# Patient Record
Sex: FEMALE | Race: WHITE | Employment: UNEMPLOYED | URBAN - METROPOLITAN AREA
[De-identification: names, ages, dates, MRNs, and addresses within clinical notes are randomized per-mention and may not be internally consistent; named-entity substitution may affect disease eponyms.]

---

## 2019-06-18 ENCOUNTER — APPOINTMENT (OUTPATIENT)
Dept: GENERAL RADIOLOGY | Age: 74
End: 2019-06-18
Payer: MEDICARE

## 2019-06-18 ENCOUNTER — APPOINTMENT (OUTPATIENT)
Dept: MRI IMAGING | Age: 74
End: 2019-06-18
Payer: MEDICARE

## 2019-06-18 ENCOUNTER — APPOINTMENT (OUTPATIENT)
Dept: ULTRASOUND IMAGING | Age: 74
End: 2019-06-18
Payer: MEDICARE

## 2019-06-18 ENCOUNTER — HOSPITAL ENCOUNTER (EMERGENCY)
Age: 74
Discharge: ANOTHER HOSPITAL | End: 2019-06-18
Attending: EMERGENCY MEDICINE
Payer: MEDICARE

## 2019-06-18 VITALS
TEMPERATURE: 98.6 F | DIASTOLIC BLOOD PRESSURE: 62 MMHG | HEIGHT: 60 IN | BODY MASS INDEX: 27.09 KG/M2 | HEART RATE: 93 BPM | SYSTOLIC BLOOD PRESSURE: 151 MMHG | OXYGEN SATURATION: 92 % | RESPIRATION RATE: 18 BRPM | WEIGHT: 138 LBS

## 2019-06-18 DIAGNOSIS — R29.898 WEAKNESS OF LEFT LOWER EXTREMITY: ICD-10-CM

## 2019-06-18 DIAGNOSIS — M79.605 PAIN OF LEFT LOWER EXTREMITY: Primary | ICD-10-CM

## 2019-06-18 LAB
ALBUMIN SERPL-MCNC: 3.4 GM/DL (ref 3.4–5)
ALP BLD-CCNC: 75 IU/L (ref 40–128)
ALT SERPL-CCNC: <5 U/L (ref 10–40)
ANION GAP SERPL CALCULATED.3IONS-SCNC: 11 MMOL/L (ref 4–16)
AST SERPL-CCNC: 11 IU/L (ref 15–37)
BASOPHILS ABSOLUTE: 0.1 K/CU MM
BASOPHILS RELATIVE PERCENT: 0.6 % (ref 0–1)
BILIRUB SERPL-MCNC: 0.2 MG/DL (ref 0–1)
BUN BLDV-MCNC: 11 MG/DL (ref 6–23)
CALCIUM SERPL-MCNC: 8.5 MG/DL (ref 8.3–10.6)
CHLORIDE BLD-SCNC: 99 MMOL/L (ref 99–110)
CO2: 22 MMOL/L (ref 21–32)
CREAT SERPL-MCNC: 0.7 MG/DL (ref 0.6–1.1)
DIFFERENTIAL TYPE: ABNORMAL
EOSINOPHILS ABSOLUTE: 0.3 K/CU MM
EOSINOPHILS RELATIVE PERCENT: 2.4 % (ref 0–3)
ERYTHROCYTE SEDIMENTATION RATE: 55 MM/HR (ref 0–30)
GFR AFRICAN AMERICAN: >60 ML/MIN/1.73M2
GFR NON-AFRICAN AMERICAN: >60 ML/MIN/1.73M2
GLUCOSE BLD-MCNC: 185 MG/DL (ref 70–99)
HCT VFR BLD CALC: 35.3 % (ref 37–47)
HEMOGLOBIN: 10.9 GM/DL (ref 12.5–16)
IMMATURE NEUTROPHIL %: 0.4 % (ref 0–0.43)
LYMPHOCYTES ABSOLUTE: 1.2 K/CU MM
LYMPHOCYTES RELATIVE PERCENT: 10.2 % (ref 24–44)
MCH RBC QN AUTO: 29.5 PG (ref 27–31)
MCHC RBC AUTO-ENTMCNC: 30.9 % (ref 32–36)
MCV RBC AUTO: 95.7 FL (ref 78–100)
MONOCYTES ABSOLUTE: 1 K/CU MM
MONOCYTES RELATIVE PERCENT: 8.6 % (ref 0–4)
NUCLEATED RBC %: 0 %
PDW BLD-RTO: 14.6 % (ref 11.7–14.9)
PLATELET # BLD: 296 K/CU MM (ref 140–440)
PMV BLD AUTO: 9.4 FL (ref 7.5–11.1)
POTASSIUM SERPL-SCNC: 4.7 MMOL/L (ref 3.5–5.1)
RBC # BLD: 3.69 M/CU MM (ref 4.2–5.4)
SEGMENTED NEUTROPHILS ABSOLUTE COUNT: 8.9 K/CU MM
SEGMENTED NEUTROPHILS RELATIVE PERCENT: 77.8 % (ref 36–66)
SODIUM BLD-SCNC: 132 MMOL/L (ref 135–145)
TOTAL IMMATURE NEUTOROPHIL: 0.05 K/CU MM
TOTAL NUCLEATED RBC: 0 K/CU MM
TOTAL PROTEIN: 6.6 GM/DL (ref 6.4–8.2)
WBC # BLD: 11.4 K/CU MM (ref 4–10.5)

## 2019-06-18 PROCEDURE — 85025 COMPLETE CBC W/AUTO DIFF WBC: CPT

## 2019-06-18 PROCEDURE — 96375 TX/PRO/DX INJ NEW DRUG ADDON: CPT

## 2019-06-18 PROCEDURE — 99285 EMERGENCY DEPT VISIT HI MDM: CPT

## 2019-06-18 PROCEDURE — 93971 EXTREMITY STUDY: CPT

## 2019-06-18 PROCEDURE — 6370000000 HC RX 637 (ALT 250 FOR IP): Performed by: EMERGENCY MEDICINE

## 2019-06-18 PROCEDURE — 85652 RBC SED RATE AUTOMATED: CPT

## 2019-06-18 PROCEDURE — A9579 GAD-BASE MR CONTRAST NOS,1ML: HCPCS

## 2019-06-18 PROCEDURE — 96374 THER/PROPH/DIAG INJ IV PUSH: CPT

## 2019-06-18 PROCEDURE — 6360000002 HC RX W HCPCS: Performed by: EMERGENCY MEDICINE

## 2019-06-18 PROCEDURE — 6360000002 HC RX W HCPCS: Performed by: PHYSICIAN ASSISTANT

## 2019-06-18 PROCEDURE — 73560 X-RAY EXAM OF KNEE 1 OR 2: CPT

## 2019-06-18 PROCEDURE — 72158 MRI LUMBAR SPINE W/O & W/DYE: CPT

## 2019-06-18 PROCEDURE — 6360000004 HC RX CONTRAST MEDICATION

## 2019-06-18 PROCEDURE — 96376 TX/PRO/DX INJ SAME DRUG ADON: CPT

## 2019-06-18 PROCEDURE — 4500000027

## 2019-06-18 PROCEDURE — 80053 COMPREHEN METABOLIC PANEL: CPT

## 2019-06-18 RX ORDER — GABAPENTIN 100 MG/1
200 CAPSULE ORAL ONCE
Status: COMPLETED | OUTPATIENT
Start: 2019-06-18 | End: 2019-06-18

## 2019-06-18 RX ORDER — MORPHINE SULFATE 4 MG/ML
4 INJECTION, SOLUTION INTRAMUSCULAR; INTRAVENOUS ONCE
Status: COMPLETED | OUTPATIENT
Start: 2019-06-18 | End: 2019-06-18

## 2019-06-18 RX ORDER — OXYCODONE HYDROCHLORIDE 5 MG/1
5 CAPSULE ORAL EVERY 4 HOURS PRN
COMMUNITY

## 2019-06-18 RX ORDER — METHOCARBAMOL 750 MG/1
750 TABLET, FILM COATED ORAL 4 TIMES DAILY PRN
COMMUNITY

## 2019-06-18 RX ORDER — GLIPIZIDE 10 MG/1
10 TABLET ORAL DAILY
COMMUNITY

## 2019-06-18 RX ORDER — CARVEDILOL 6.25 MG/1
6.25 TABLET ORAL 2 TIMES DAILY WITH MEALS
COMMUNITY

## 2019-06-18 RX ORDER — ONDANSETRON 2 MG/ML
4 INJECTION INTRAMUSCULAR; INTRAVENOUS ONCE
Status: COMPLETED | OUTPATIENT
Start: 2019-06-18 | End: 2019-06-18

## 2019-06-18 RX ORDER — GABAPENTIN 100 MG/1
200 CAPSULE ORAL EVERY 6 HOURS
COMMUNITY

## 2019-06-18 RX ADMIN — MORPHINE SULFATE 4 MG: 4 INJECTION INTRAVENOUS at 11:11

## 2019-06-18 RX ADMIN — ONDANSETRON 4 MG: 2 INJECTION INTRAMUSCULAR; INTRAVENOUS at 02:20

## 2019-06-18 RX ADMIN — MORPHINE SULFATE 4 MG: 4 INJECTION INTRAVENOUS at 05:56

## 2019-06-18 RX ADMIN — MORPHINE SULFATE 4 MG: 4 INJECTION INTRAVENOUS at 02:20

## 2019-06-18 RX ADMIN — GADOTERIDOL 13 ML: 279.3 INJECTION, SOLUTION INTRAVENOUS at 06:37

## 2019-06-18 RX ADMIN — GABAPENTIN 200 MG: 100 CAPSULE ORAL at 10:20

## 2019-06-18 SDOH — HEALTH STABILITY: MENTAL HEALTH: HOW OFTEN DO YOU HAVE A DRINK CONTAINING ALCOHOL?: NEVER

## 2019-06-18 ASSESSMENT — PAIN DESCRIPTION - ORIENTATION: ORIENTATION: LEFT

## 2019-06-18 ASSESSMENT — PAIN DESCRIPTION - LOCATION: LOCATION: KNEE

## 2019-06-18 ASSESSMENT — PAIN SCALES - GENERAL
PAINLEVEL_OUTOF10: 10
PAINLEVEL_OUTOF10: 9
PAINLEVEL_OUTOF10: 10
PAINLEVEL_OUTOF10: 9

## 2019-06-18 ASSESSMENT — PAIN DESCRIPTION - PAIN TYPE: TYPE: ACUTE PAIN

## 2019-06-18 NOTE — ED PROVIDER NOTES
PCP: Beuford Severs    CHIEF COMPLAINT    Chief Complaint   Patient presents with    Knee Pain     L knee       HPI    In brief, Iris Son is a 75 y.o. female who presents with previous back surgery now with leg pain and weakness. Signout by Dr. Rey Vega for pending MRI lumbar spine. PHYSICAL EXAM    VITAL SIGNS: BP (!) 133/59   Pulse 86   Temp 98.2 °F (36.8 °C) (Oral)   Resp 18   Ht 4' 11.5\" (1.511 m)   Wt 138 lb (62.6 kg)   SpO2 93%   BMI 27.41 kg/m²    Focused exam revealed awake, alert, well-hydrated, well-nourished, and in no acute distress. Speech is clear. Breathing is unlabored. Skin is dry. Mental status is normal. The patient moves all extremities. ED COURSE & MEDICAL DECISION MAKING       Vital signs and nursing notes reviewed during ED course. All pertinent Lab data and radiographic results reviewed with patient at bedside. The patient and/or the family were informed of the results of any tests/labs/imaging, the treatment plan, and time was allotted to answer questions. 69-year-old female who was signed out to me by the previous provider pending MRI of the spine. See their note for further details. Patient on exam does have weakness in dorsiflexion of the left foot, also the left great toe and some mild plantarflexion of the left foot. The pain and weakness are reported to be new since last night. CT scan does show L1 burst fracture with fragments compressing the conus. This does appear to be from her previous injury for which she had surgery. I did speak with Dr. Michael Castle who is the patient's neurosurgeon. As she does have new pain and weakness in the left lower extremity and had been up walking using a walker with a back brace prior to this and is no longer able to do this plan will be transferred to [facility] for further evaluation. I spoke with Dr. Cornell Melara, who agrees to accept the patient in transfer. She is currently stable for transfer.     Clinical IMPRESSION    Left lower extremity pain, left foot weakness, L1 fracture with cord contusion post surgical           (Please note the MDM and HPI sections of this note were completed with a voice recognition program.  Efforts were made to edit the dictations but occasionally words are mis-transcribed.)      Mark Hollis DO  06/18/19 1025

## 2019-06-18 NOTE — ED PROVIDER NOTES
CELESTINA STAFFING NOTED    I independently examined and evaluated Pamela Prieto. In brief their history revealed left lower extremity pain for last 24 hours. Patient denies any injury or trauma. She is on Xarelto for previous stroke. Patient had a spinal procedure in her lumbar spine in May at [facility].  She is denying any current back pain but does report diffuse lower extremity pain from her left hip/groin region all the way to her ankle. Patient usually walks with a walker at home however for the last 24 hours has not been able to get around. Since being released from rehab on June 11, patient has had issues with urination requiring straight cath. Patient denies any urinary incontinence, bowel incontinence/retention or saddle paresthesias. On exam, the patient appears well-hydrated, well-nourished, and in no acute distress. Mucous membranes are moist. Speech is clear. Breathing is unlabored. Skin is dry. Mental status is normal. Patient is without facial droop. Surgical incision over lower lumbar region is clean/dry/intact without overlying warmth erythema. HIGH sensitivity Neuro Exam for Lumbar spine  -(L1-L2)  inner thigh sensation intact  -(S3,4,5) groin sensation intact      -Lower extremity ROM testing       -(L2) cross legs (adduct thighs)        -(L3) extend knees & flex knees (S1);  5/5 in right lower extremity, 4/5 in left lower extremity       -(L4) dorsiflex ankles; 5/5 in right lower extremity, 4/5 in left lower extremity       -(L5) point great toes upward & plantar flex toes (S2)        -(L2,3,4) Knee reflexes intact (2+) bilaterally  Achilles reflexes intact (2+) bilaterally          ED course: Vitals are stable. Patient's labs fairly unrevealing aside from a mild leukocytosis. Patient does have an elevated ESR at 55. Patient had lumbar surgery in May 2019. Her incision is clean/dry/intact with no evidence of superficial skin infection.   Patient does have weakness on
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
## 2019-06-18 NOTE — ED NOTES
Cath placed at this time d/t self cathing. Daughter reports pt has had to straight cath since back surgery.      Jordyn Ornelas RN  06/18/19 4897

## 2019-06-18 NOTE — ED NOTES
Transport at bedside. Patient is now requesting the morphine that she had previously denied. Dr Corona Carlos notified. Verbal order received to give Morphine that was previously ordered but not given. Arlene Collier RN  06/18/19 0914

## 2019-06-18 NOTE — ED TRIAGE NOTES
Patient refused the morphine for pain. She states that it does nothing for her legs which is what is hurting her. She states she would like her nerve pill, which she believes is gabapentin. Patient was unsure of the dosage. Daughter Earnest Flores called who helps her with her medications. Med rec completed. Verbal order received from Dr Rickie Cook to order the patient's home dose of gabapentin.

## 2019-06-18 NOTE — ED NOTES
Report called to [facility] [facility] RN. Awaiting transport. Arlene Villavicencio RN  06/18/19 2238

## 2019-06-18 NOTE — ED NOTES
QCT at bedside to transport the patient. Report given. Arlene Howard [facility]  06/18/19 6499

## 2022-07-13 NOTE — ED NOTES
Bed: ED-18  Expected date:   Expected time:   Means of arrival:   Comments:  EMS- 75 F leg pain     Zoraida Chaudhary RN  06/18/19 6886 Check labs  Stop aldactone

## 2024-06-20 ENCOUNTER — HOSPITAL ENCOUNTER (OUTPATIENT)
Dept: WOUND CARE | Age: 79
Discharge: HOME | End: 2024-06-20
Payer: MEDICARE

## 2024-06-20 VITALS
DIASTOLIC BLOOD PRESSURE: 70 MMHG | HEART RATE: 76 BPM | SYSTOLIC BLOOD PRESSURE: 165 MMHG | RESPIRATION RATE: 18 BRPM | TEMPERATURE: 96.9 F

## 2024-06-20 DIAGNOSIS — E66.3 OVERWEIGHT (BMI 25.0-29.9): ICD-10-CM

## 2024-06-20 DIAGNOSIS — L89.313: ICD-10-CM

## 2024-06-20 DIAGNOSIS — N39.45 CONTINUOUS LEAKAGE OF URINE: ICD-10-CM

## 2024-06-20 DIAGNOSIS — E11.622 TYPE 2 DIABETES MELLITUS WITH OTHER SKIN ULCER, WITHOUT LONG-TERM CURRENT USE OF INSULIN (HCC): ICD-10-CM

## 2024-06-20 DIAGNOSIS — I86.3 LABIAL VARICOSITIES: ICD-10-CM

## 2024-06-20 DIAGNOSIS — E11.42 DIABETIC POLYNEUROPATHY ASSOCIATED WITH TYPE 2 DIABETES MELLITUS (HCC): ICD-10-CM

## 2024-06-20 DIAGNOSIS — I89.0 LYMPHEDEMA OF BOTH LOWER EXTREMITIES: ICD-10-CM

## 2024-06-20 DIAGNOSIS — L89.213 DECUBITUS ULCER OF RIGHT THIGH, STAGE 3 (HCC): Primary | ICD-10-CM

## 2024-06-20 DIAGNOSIS — I87.2 VENOUS STASIS DERMATITIS OF BOTH LOWER EXTREMITIES: ICD-10-CM

## 2024-06-20 PROBLEM — E11.628 TYPE 2 DIABETES MELLITUS WITH SKIN COMPLICATION, WITHOUT LONG-TERM CURRENT USE OF INSULIN (HCC): Status: ACTIVE | Noted: 2024-06-20

## 2024-06-20 PROCEDURE — 99203 OFFICE O/P NEW LOW 30 MIN: CPT

## 2024-06-20 PROCEDURE — 99213 OFFICE O/P EST LOW 20 MIN: CPT

## 2024-06-20 PROCEDURE — 99203 OFFICE O/P NEW LOW 30 MIN: CPT | Performed by: NURSE PRACTITIONER

## 2024-06-20 PROCEDURE — 11042 DBRDMT SUBQ TIS 1ST 20SQCM/<: CPT

## 2024-06-20 PROCEDURE — 11042 DBRDMT SUBQ TIS 1ST 20SQCM/<: CPT | Performed by: NURSE PRACTITIONER

## 2024-06-20 ASSESSMENT — PAIN DESCRIPTION - DESCRIPTORS: DESCRIPTORS: SORE

## 2024-06-20 ASSESSMENT — PAIN SCALES - GENERAL: PAINLEVEL_OUTOF10: 9

## 2024-06-20 ASSESSMENT — PAIN DESCRIPTION - ONSET: ONSET: ON-GOING

## 2024-06-20 ASSESSMENT — PAIN - FUNCTIONAL ASSESSMENT: PAIN_FUNCTIONAL_ASSESSMENT: PREVENTS OR INTERFERES SOME ACTIVE ACTIVITIES AND ADLS

## 2024-06-20 ASSESSMENT — PAIN DESCRIPTION - LOCATION: LOCATION: BUTTOCKS

## 2024-06-20 ASSESSMENT — PAIN DESCRIPTION - PAIN TYPE: TYPE: ACUTE PAIN

## 2024-06-20 ASSESSMENT — PAIN DESCRIPTION - FREQUENCY: FREQUENCY: CONTINUOUS

## 2024-06-20 NOTE — PROGRESS NOTES
susceptibility  artifact related to the hardware from the fusion.    SPINAL CORD:  The conus terminates near the level of the L1-L2 disc space.  The cauda equina nerve roots are unremarkable.  There appears to be mild  edema in the cord at the level of L1 which may be related to cord contusion.  No areas of abnormal enhancement are noted.    SOFT TISSUES: No paraspinal masses are identified.    T12-L1: The L1 vertebral body is retropulsed into the spinal canal with  flattening along the right anterolateral margin of the conus.  L1 laminectomy  is noted.  There is severe central spinal canal narrowing.  Mild bilateral  foraminal narrowing.    L1-L2: There is a minimal disc bulge-osteophyte complex lateralizing to the  left.  Minimal central spinal canal narrowing.  No right foraminal narrowing.  Mild left foraminal narrowing.    L2-L3: There is a minimal disc bulge indenting the anterior thecal sac.  There is facet arthropathy and ligamentum flavum thickening.  No central  spinal canal stenosis.  The neural foramen are patent.    L3-L4: There is a disc bulge indenting the anterior thecal sac measuring  approximately 1 mm.  There is no central spinal canal narrowing.  The neural  foramen are patent.    L4-L5: There is a 2 mm disc bulge lateralizing to the left.  There is facet  arthropathy and ligamentum flavum thickening.  Minimal central spinal canal  narrowing.  The neural foramen are patent.    L5-S1: There is no significant disc protrusion, spinal canal stenosis or  neural foraminal narrowing.  Impression: 1. Acute/subacute L1 burst fracture with retropulsion of fracture fragments  into the spinal canal, compressing the right anterior margin of the conus  with mild cord contusion.  This has approximately 50% loss of vertebral body  height.  2. Postsurgical changes are noted from a T11-L3 posterior metallic fusion  with associated L1 laminectomy.  No postoperative complication.  3. No other acute fracture.  VL DUP

## 2024-06-20 NOTE — PLAN OF CARE
Patient Info:    Minoo Molly  [address] [place] [address]  [phone]  : 1945  Age: 79 y.o.  Gender: female  Todays Date: 2024    Insurance Info:    Plan: ANTHRODRI MEDIBLUE ESSENTIAL/PLUS  Coverage: BCBS MEDICARE  Effective Date: 2018  Group Number: [unfilled]  Subscriber Number: [id number] - (Medicare Managed)    Payer/Plan Subscr  Sex Relation Sub. Ins. ID Effective Group Num   1. [facility] MEDICARE* MOLLYMINOO 1945 Female Self [id number]                                    [id number]

## 2024-06-20 NOTE — PATIENT INSTRUCTIONS
PHYSICIAN ORDERS AND DISCHARGE INSTRUCTIONS    Wound cleansing:     Do not scrub or use excessive force.   Wash hands with soap and water before and after dressing changes.   Prior to applying a clean dressing, cleanse wound with normal saline,               wound cleanser, or mild soap and water.    Ask the physician or nurse before getting the wound(s) wet in a shower      Wound Care Notes:           Orders for this week:  2024       Referral being sent to [facility]     Right Thigh :Wash with soap and water. Pat dry.   Apply Gentamicin, Puracol Ag to wound bed.  Secure with Mastisol,Gentac andTegaderm secured with Medipore  Change Daily and with each incontinence.     Right Labia- Clean with soap and water, pat dry.  Apply Desatin and Stimulen to wound bed. Apply 2x day and with each incontinence.     Dispense 30 day quantity when ordering supplies.  Plan:       Nurse Visit:   Follow Up Instructions: At the Wound Care Center in 1 week   Primary Wound Care Provider: Merly Rodriguez CNP   Call  for any questions or concerns.  Central [phone] for imaging and lab work

## 2024-06-20 NOTE — PLAN OF CARE
Patient Info:    Minoo Molly  [address] [place] [address]  [phone]  : 1945  Age: 79 y.o.  Gender: female  Todays Date: 2024    Insurance Info:    Plan: ANTHRODRI MEDIBLUE ESSENTIAL/PLUS  Coverage: BCBS MEDICARE  Effective Date: 2018  Group Number: [unfilled]  Subscriber Number: [id number] - (Medicare Managed)    Payer/Plan Subscr  Sex Relation Sub. Ins. ID Effective Group Num   1. [facility] MEDICARE* MOLLYMINOO 1945 Female Self [id number] [id number]                                    [id number]

## 2024-06-27 ENCOUNTER — HOSPITAL ENCOUNTER (OUTPATIENT)
Dept: WOUND CARE | Age: 79
Discharge: HOME | End: 2024-06-27
Payer: MEDICARE

## 2024-06-27 VITALS
DIASTOLIC BLOOD PRESSURE: 60 MMHG | TEMPERATURE: 98.3 F | HEART RATE: 81 BPM | RESPIRATION RATE: 16 BRPM | SYSTOLIC BLOOD PRESSURE: 128 MMHG

## 2024-06-27 DIAGNOSIS — N39.45 CONTINUOUS LEAKAGE OF URINE: ICD-10-CM

## 2024-06-27 DIAGNOSIS — I89.0 LYMPHEDEMA OF BOTH LOWER EXTREMITIES: ICD-10-CM

## 2024-06-27 DIAGNOSIS — E11.42 DIABETIC POLYNEUROPATHY ASSOCIATED WITH TYPE 2 DIABETES MELLITUS (HCC): ICD-10-CM

## 2024-06-27 DIAGNOSIS — E66.3 OVERWEIGHT (BMI 25.0-29.9): ICD-10-CM

## 2024-06-27 DIAGNOSIS — L89.213 DECUBITUS ULCER OF RIGHT THIGH, STAGE 3 (HCC): ICD-10-CM

## 2024-06-27 DIAGNOSIS — I87.2 VENOUS STASIS DERMATITIS OF BOTH LOWER EXTREMITIES: Primary | ICD-10-CM

## 2024-06-27 DIAGNOSIS — I86.3 LABIAL VARICOSITIES: ICD-10-CM

## 2024-06-27 DIAGNOSIS — E11.622 TYPE 2 DIABETES MELLITUS WITH OTHER SKIN ULCER, WITHOUT LONG-TERM CURRENT USE OF INSULIN (HCC): ICD-10-CM

## 2024-06-27 DIAGNOSIS — L89.313: ICD-10-CM

## 2024-06-27 PROCEDURE — 11042 DBRDMT SUBQ TIS 1ST 20SQCM/<: CPT

## 2024-06-27 ASSESSMENT — PAIN DESCRIPTION - ONSET: ONSET: ON-GOING

## 2024-06-27 ASSESSMENT — PAIN - FUNCTIONAL ASSESSMENT: PAIN_FUNCTIONAL_ASSESSMENT: PREVENTS OR INTERFERES SOME ACTIVE ACTIVITIES AND ADLS

## 2024-06-27 ASSESSMENT — PAIN DESCRIPTION - PAIN TYPE: TYPE: ACUTE PAIN

## 2024-06-27 ASSESSMENT — PAIN SCALES - GENERAL: PAINLEVEL_OUTOF10: 5

## 2024-06-27 ASSESSMENT — PAIN DESCRIPTION - FREQUENCY: FREQUENCY: CONTINUOUS

## 2024-06-27 ASSESSMENT — PAIN DESCRIPTION - DESCRIPTORS: DESCRIPTORS: SORE

## 2024-06-27 ASSESSMENT — PAIN DESCRIPTION - LOCATION: LOCATION: BUTTOCKS

## 2024-06-27 NOTE — PATIENT INSTRUCTIONS
PHYSICIAN ORDERS AND DISCHARGE INSTRUCTIONS    Wound cleansing:     Do not scrub or use excessive force.   Wash hands with soap and water before and after dressing changes.   Prior to applying a clean dressing, cleanse wound with normal saline,               wound cleanser, or mild soap and water.    Ask the physician or nurse before getting the wound(s) wet in a shower      Wound Care Notes:           Orders for this week:  2024    Right Labia - Clean with soap and water, pat dry.  Apply Desitin and Stimulen to wound bed. Apply 2x day and with each incontinence.     Dispense 30 day quantity when ordering supplies.  Plan:       Nurse Visit:   Follow Up Instructions: At the Wound Care Center in 2 week   Primary Wound Care Provider: Merly Rodriguez CNP   Call  for any questions or concerns.  Central [phone] for imaging and lab work

## 2024-06-27 NOTE — PROGRESS NOTES
Wound Care Center Progress Visit      Kristyn Rascon  AGE: 79 y.o.   GENDER: female  : 1945  EPISODE DATE:  2024   Referred by: Livier Cisneros APRN - CNP     Subjective:     CHIEF COMPLAINT  WOUND   Problem List Items Addressed This Visit          Circulatory    Labial varicosities    Venous stasis dermatitis of both lower extremities - Primary       Endocrine    Type 2 diabetes mellitus with skin complication, without long-term current use of insulin (HCC)    Diabetic polyneuropathy associated with type 2 diabetes mellitus (HCC)       Other    Decubitus ulcer of right thigh, stage 3 (HCC)    Decubitus ulcer of right perineal (labia) ischial region, stage 3 (HCC)    Continuous leakage of urine    Overweight (BMI 25.0-29.9)    Lymphedema of both lower extremities     Chief Complaint   Patient presents with    Wound Check        HISTORY of PRESENT ILLNESS      Kristyn Rascon is a 79 y.o. female who presents to the Wound Clinic for evaluation and treatment of Chronic pressure  ulcer(s) of  right thigh and right perineum (labia).  The condition is of moderate severity. The ulcer has been present for approximately greater than six months at initial visit to the wound clinic 24. The underlying cause is thought to be pressure and moisture. The patients care to date has included previous wound care at the [place] wound clinic. The patient has significant underlying medical conditions as below. Livier Cisneros APRN - CNP is PCP.    Wound Pain Timing/Severity: waxing and waning, moderate  Quality of pain: aching, tender, pressure  Severity of pain:  3 / 10   Modifying Factors: venous stasis, lymphedema, diabetes, poor glucose control, chronic pressure, decreased mobility, shear force, incontinence of urine, and overweight  Associated Signs/Symptoms: edema, erythema, drainage, and pain    DEMETRIO: as indicated base on wound location and assessment    Wound infection: wound culture will be obtained as needed

## 2024-07-11 ENCOUNTER — HOSPITAL ENCOUNTER (OUTPATIENT)
Dept: WOUND CARE | Age: 79
Discharge: HOME | End: 2024-07-11

## 2024-07-11 NOTE — PATIENT INSTRUCTIONS
PHYSICIAN ORDERS AND DISCHARGE INSTRUCTIONS    Wound cleansing:     Do not scrub or use excessive force.   Wash hands with soap and water before and after dressing changes.   Prior to applying a clean dressing, cleanse wound with normal saline,               wound cleanser, or mild soap and water.    Ask the physician or nurse before getting the wound(s) wet in a shower      Wound Care Notes:           Orders for this week:  2024    Right Labia - Clean with soap and water, pat dry.  Apply Desitin and Stimulen to wound bed. Apply 2x day and with each incontinence.     Dispense 30 day quantity when ordering supplies.  Plan:       Nurse Visit:   Follow Up Instructions: At the Wound Care Center in 2 week   Primary Wound Care Provider: Merly Rodriguez CNP   Call  for any questions or concerns.  Central [phone] for imaging and lab work

## 2024-07-18 ENCOUNTER — HOSPITAL ENCOUNTER (OUTPATIENT)
Dept: WOUND CARE | Age: 79
Discharge: HOME | End: 2024-07-18

## 2024-07-18 NOTE — PATIENT INSTRUCTIONS
PHYSICIAN ORDERS AND DISCHARGE INSTRUCTIONS    Wound cleansing:     Do not scrub or use excessive force.   Wash hands with soap and water before and after dressing changes.   Prior to applying a clean dressing, cleanse wound with normal saline,               wound cleanser, or mild soap and water.    Ask the physician or nurse before getting the wound(s) wet in a shower      Wound Care Notes:           Orders for this week:  2024    Right Labia - Clean with soap and water, pat dry.  Apply Desitin and Stimulen to wound bed. Apply 2x day and with each incontinence.     Dispense 30 day quantity when ordering supplies.  Plan:       Nurse Visit:   Follow Up Instructions: At the Wound Care Center in 2 week   Primary Wound Care Provider: Merly Rodriguez CNP   Call  for any questions or concerns.  Central [phone] for imaging and lab work

## 2024-08-20 ENCOUNTER — TELEPHONE (OUTPATIENT)
Dept: WOUND CARE | Age: 79
End: 2024-08-20

## 2024-08-20 NOTE — TELEPHONE ENCOUNTER
Telephone call to follow up on status of wound ; left message for patient /family to return call to the [facility] if appointment is needed.

## 2024-09-05 ENCOUNTER — TELEPHONE (OUTPATIENT)
Dept: WOUND CARE | Age: 79
End: 2024-09-05

## 2024-09-05 NOTE — TELEPHONE ENCOUNTER
This SN called to follow up to follow up on status of wounds ; left message for patient/family to return call to the [facility] to schedule follow up appointment if needed.

## 2024-09-24 ENCOUNTER — TELEPHONE (OUTPATIENT)
Dept: WOUND CARE | Age: 79
End: 2024-09-24

## 2024-12-07 ENCOUNTER — APPOINTMENT (OUTPATIENT)
Dept: GENERAL RADIOLOGY | Age: 79
DRG: 481 | End: 2024-12-07
Attending: EMERGENCY MEDICINE
Payer: MEDICARE

## 2024-12-07 ENCOUNTER — HOSPITAL ENCOUNTER (INPATIENT)
Age: 79
LOS: 5 days | Discharge: NURSING, REHAB OR LONG-TERM CARE | DRG: 481 | End: 2024-12-12
Attending: EMERGENCY MEDICINE | Admitting: STUDENT IN AN ORGANIZED HEALTH CARE EDUCATION/TRAINING PROGRAM
Payer: MEDICARE

## 2024-12-07 DIAGNOSIS — S72.342A CLOSED DISPLACED SPIRAL FRACTURE OF SHAFT OF LEFT FEMUR, INITIAL ENCOUNTER (HCC): Primary | ICD-10-CM

## 2024-12-07 LAB
ANION GAP SERPL CALCULATED.3IONS-SCNC: 11 MMOL/L (ref 9–17)
BASOPHILS # BLD: 0.08 K/UL
BASOPHILS NFR BLD: 1 % (ref 0–1)
BUN SERPL-MCNC: 22 MG/DL (ref 7–20)
CALCIUM SERPL-MCNC: 8.7 MG/DL (ref 8.3–10.6)
CHLORIDE SERPL-SCNC: 105 MMOL/L (ref 99–110)
CO2 SERPL-SCNC: 21 MMOL/L (ref 21–32)
CREAT SERPL-MCNC: 1.2 MG/DL (ref 0.6–1.2)
EOSINOPHIL # BLD: 0.47 K/UL
EOSINOPHILS RELATIVE PERCENT: 4 % (ref 0–3)
ERYTHROCYTE [DISTWIDTH] IN BLOOD BY AUTOMATED COUNT: 13.9 % (ref 11.7–14.9)
GFR, ESTIMATED: 45 ML/MIN/1.73M2
GLUCOSE BLD-MCNC: 266 MG/DL (ref 74–99)
GLUCOSE SERPL-MCNC: 185 MG/DL (ref 74–99)
HCT VFR BLD AUTO: 35.8 % (ref 37–47)
HGB BLD-MCNC: 11.2 G/DL (ref 12.5–16)
IMM GRANULOCYTES # BLD AUTO: 0.09 K/UL
IMM GRANULOCYTES NFR BLD: 1 %
LYMPHOCYTES NFR BLD: 2.4 K/UL
LYMPHOCYTES RELATIVE PERCENT: 19 % (ref 24–44)
MCH RBC QN AUTO: 29.5 PG (ref 27–31)
MCHC RBC AUTO-ENTMCNC: 31.3 G/DL (ref 32–36)
MCV RBC AUTO: 94.2 FL (ref 78–100)
MONOCYTES NFR BLD: 0.93 K/UL
MONOCYTES NFR BLD: 7 % (ref 0–4)
NEUTROPHILS NFR BLD: 69 % (ref 36–66)
NEUTS SEG NFR BLD: 8.99 K/UL
PLATELET # BLD AUTO: 283 K/UL (ref 140–440)
PMV BLD AUTO: 10.1 FL (ref 7.5–11.1)
POTASSIUM SERPL-SCNC: 4.8 MMOL/L (ref 3.5–5.1)
RBC # BLD AUTO: 3.8 M/UL (ref 4.2–5.4)
SODIUM SERPL-SCNC: 136 MMOL/L (ref 136–145)
WBC OTHER # BLD: 13 K/UL (ref 4–10.5)

## 2024-12-07 PROCEDURE — 82962 GLUCOSE BLOOD TEST: CPT

## 2024-12-07 PROCEDURE — 99285 EMERGENCY DEPT VISIT HI MDM: CPT

## 2024-12-07 PROCEDURE — 6360000002 HC RX W HCPCS: Performed by: STUDENT IN AN ORGANIZED HEALTH CARE EDUCATION/TRAINING PROGRAM

## 2024-12-07 PROCEDURE — 73502 X-RAY EXAM HIP UNI 2-3 VIEWS: CPT

## 2024-12-07 PROCEDURE — 6370000000 HC RX 637 (ALT 250 FOR IP): Performed by: FAMILY MEDICINE

## 2024-12-07 PROCEDURE — 6370000000 HC RX 637 (ALT 250 FOR IP): Performed by: STUDENT IN AN ORGANIZED HEALTH CARE EDUCATION/TRAINING PROGRAM

## 2024-12-07 PROCEDURE — 85025 COMPLETE CBC W/AUTO DIFF WBC: CPT

## 2024-12-07 PROCEDURE — 2580000003 HC RX 258: Performed by: STUDENT IN AN ORGANIZED HEALTH CARE EDUCATION/TRAINING PROGRAM

## 2024-12-07 PROCEDURE — 96374 THER/PROPH/DIAG INJ IV PUSH: CPT

## 2024-12-07 PROCEDURE — 6360000002 HC RX W HCPCS: Performed by: EMERGENCY MEDICINE

## 2024-12-07 PROCEDURE — 1200000000 HC SEMI PRIVATE

## 2024-12-07 PROCEDURE — 80048 BASIC METABOLIC PNL TOTAL CA: CPT

## 2024-12-07 RX ORDER — OXYCODONE HYDROCHLORIDE 10 MG/1
10 TABLET ORAL EVERY 4 HOURS PRN
Status: DISCONTINUED | OUTPATIENT
Start: 2024-12-07 | End: 2024-12-08

## 2024-12-07 RX ORDER — POLYETHYLENE GLYCOL 3350 17 G/17G
17 POWDER, FOR SOLUTION ORAL DAILY PRN
Status: DISCONTINUED | OUTPATIENT
Start: 2024-12-07 | End: 2024-12-12 | Stop reason: HOSPADM

## 2024-12-07 RX ORDER — TROSPIUM CHLORIDE 20 MG/1
20 TABLET, FILM COATED ORAL NIGHTLY
Status: DISCONTINUED | OUTPATIENT
Start: 2024-12-08 | End: 2024-12-12 | Stop reason: HOSPADM

## 2024-12-07 RX ORDER — SODIUM CHLORIDE 0.9 % (FLUSH) 0.9 %
5-40 SYRINGE (ML) INJECTION PRN
Status: DISCONTINUED | OUTPATIENT
Start: 2024-12-07 | End: 2024-12-12 | Stop reason: HOSPADM

## 2024-12-07 RX ORDER — METHOCARBAMOL 500 MG/1
750 TABLET, FILM COATED ORAL 4 TIMES DAILY PRN
Status: DISCONTINUED | OUTPATIENT
Start: 2024-12-07 | End: 2024-12-12 | Stop reason: HOSPADM

## 2024-12-07 RX ORDER — HYDROMORPHONE HYDROCHLORIDE 1 MG/ML
1 INJECTION, SOLUTION INTRAMUSCULAR; INTRAVENOUS; SUBCUTANEOUS ONCE
Status: COMPLETED | OUTPATIENT
Start: 2024-12-07 | End: 2024-12-07

## 2024-12-07 RX ORDER — GABAPENTIN 100 MG/1
200 CAPSULE ORAL EVERY 6 HOURS
Status: DISCONTINUED | OUTPATIENT
Start: 2024-12-07 | End: 2024-12-07

## 2024-12-07 RX ORDER — TROSPIUM CHLORIDE 20 MG/1
20 TABLET, FILM COATED ORAL
Status: DISCONTINUED | OUTPATIENT
Start: 2024-12-08 | End: 2024-12-07

## 2024-12-07 RX ORDER — OXYCODONE HYDROCHLORIDE 5 MG/1
5 TABLET ORAL EVERY 4 HOURS PRN
Status: DISCONTINUED | OUTPATIENT
Start: 2024-12-07 | End: 2024-12-08

## 2024-12-07 RX ORDER — SODIUM CHLORIDE 9 MG/ML
INJECTION, SOLUTION INTRAVENOUS PRN
Status: DISCONTINUED | OUTPATIENT
Start: 2024-12-07 | End: 2024-12-12 | Stop reason: HOSPADM

## 2024-12-07 RX ORDER — GABAPENTIN 100 MG/1
200 CAPSULE ORAL 2 TIMES DAILY
Status: DISCONTINUED | OUTPATIENT
Start: 2024-12-08 | End: 2024-12-12 | Stop reason: HOSPADM

## 2024-12-07 RX ORDER — LEVOTHYROXINE SODIUM 88 UG/1
88 TABLET ORAL DAILY
Status: DISCONTINUED | OUTPATIENT
Start: 2024-12-08 | End: 2024-12-12 | Stop reason: HOSPADM

## 2024-12-07 RX ORDER — DEXTROSE MONOHYDRATE 100 MG/ML
INJECTION, SOLUTION INTRAVENOUS CONTINUOUS PRN
Status: DISCONTINUED | OUTPATIENT
Start: 2024-12-07 | End: 2024-12-12 | Stop reason: HOSPADM

## 2024-12-07 RX ORDER — SODIUM CHLORIDE 0.9 % (FLUSH) 0.9 %
5-40 SYRINGE (ML) INJECTION EVERY 12 HOURS SCHEDULED
Status: DISCONTINUED | OUTPATIENT
Start: 2024-12-07 | End: 2024-12-12 | Stop reason: HOSPADM

## 2024-12-07 RX ORDER — CARVEDILOL 6.25 MG/1
6.25 TABLET ORAL 2 TIMES DAILY WITH MEALS
Status: DISCONTINUED | OUTPATIENT
Start: 2024-12-07 | End: 2024-12-12 | Stop reason: HOSPADM

## 2024-12-07 RX ORDER — ONDANSETRON 4 MG/1
4 TABLET, ORALLY DISINTEGRATING ORAL EVERY 8 HOURS PRN
Status: DISCONTINUED | OUTPATIENT
Start: 2024-12-07 | End: 2024-12-08 | Stop reason: SDUPTHER

## 2024-12-07 RX ORDER — MAGNESIUM SULFATE IN WATER 40 MG/ML
2000 INJECTION, SOLUTION INTRAVENOUS PRN
Status: DISCONTINUED | OUTPATIENT
Start: 2024-12-07 | End: 2024-12-12 | Stop reason: HOSPADM

## 2024-12-07 RX ORDER — HYDROMORPHONE HYDROCHLORIDE 1 MG/ML
0.5 INJECTION, SOLUTION INTRAMUSCULAR; INTRAVENOUS; SUBCUTANEOUS
Status: COMPLETED | OUTPATIENT
Start: 2024-12-07 | End: 2024-12-07

## 2024-12-07 RX ORDER — ENOXAPARIN SODIUM 100 MG/ML
40 INJECTION SUBCUTANEOUS EVERY EVENING
Status: DISCONTINUED | OUTPATIENT
Start: 2024-12-08 | End: 2024-12-08

## 2024-12-07 RX ORDER — ACETAMINOPHEN 325 MG/1
650 TABLET ORAL EVERY 6 HOURS SCHEDULED
Status: DISCONTINUED | OUTPATIENT
Start: 2024-12-08 | End: 2024-12-12 | Stop reason: HOSPADM

## 2024-12-07 RX ORDER — POTASSIUM CHLORIDE 7.45 MG/ML
10 INJECTION INTRAVENOUS PRN
Status: DISCONTINUED | OUTPATIENT
Start: 2024-12-07 | End: 2024-12-12 | Stop reason: HOSPADM

## 2024-12-07 RX ORDER — GLUCAGON 1 MG/ML
1 KIT INJECTION PRN
Status: DISCONTINUED | OUTPATIENT
Start: 2024-12-07 | End: 2024-12-12 | Stop reason: HOSPADM

## 2024-12-07 RX ORDER — ONDANSETRON 2 MG/ML
4 INJECTION INTRAMUSCULAR; INTRAVENOUS EVERY 6 HOURS PRN
Status: DISCONTINUED | OUTPATIENT
Start: 2024-12-07 | End: 2024-12-08 | Stop reason: SDUPTHER

## 2024-12-07 RX ORDER — INSULIN LISPRO 100 [IU]/ML
0-4 INJECTION, SOLUTION INTRAVENOUS; SUBCUTANEOUS
Status: DISCONTINUED | OUTPATIENT
Start: 2024-12-07 | End: 2024-12-09

## 2024-12-07 RX ADMIN — HYDROMORPHONE HYDROCHLORIDE 0.5 MG: 1 INJECTION, SOLUTION INTRAMUSCULAR; INTRAVENOUS; SUBCUTANEOUS at 23:19

## 2024-12-07 RX ADMIN — ACETAMINOPHEN 650 MG: 325 TABLET ORAL at 23:18

## 2024-12-07 RX ADMIN — INSULIN LISPRO 2 UNITS: 100 INJECTION, SOLUTION INTRAVENOUS; SUBCUTANEOUS at 23:54

## 2024-12-07 RX ADMIN — HYDROMORPHONE HYDROCHLORIDE 1 MG: 1 INJECTION, SOLUTION INTRAMUSCULAR; INTRAVENOUS; SUBCUTANEOUS at 21:37

## 2024-12-07 RX ADMIN — SODIUM CHLORIDE, PRESERVATIVE FREE 10 ML: 5 INJECTION INTRAVENOUS at 23:25

## 2024-12-07 ASSESSMENT — PAIN SCALES - GENERAL
PAINLEVEL_OUTOF10: 8
PAINLEVEL_OUTOF10: 9
PAINLEVEL_OUTOF10: 10
PAINLEVEL_OUTOF10: 7

## 2024-12-07 ASSESSMENT — PAIN DESCRIPTION - ORIENTATION
ORIENTATION: LEFT
ORIENTATION: LEFT

## 2024-12-07 ASSESSMENT — PAIN DESCRIPTION - DESCRIPTORS
DESCRIPTORS: ACHING
DESCRIPTORS: SHOOTING;SHARP

## 2024-12-07 ASSESSMENT — PAIN DESCRIPTION - LOCATION
LOCATION: HIP;LEG
LOCATION: HIP

## 2024-12-07 ASSESSMENT — PAIN SCALES - WONG BAKER: WONGBAKER_NUMERICALRESPONSE: HURTS LITTLE MORE

## 2024-12-07 ASSESSMENT — PAIN - FUNCTIONAL ASSESSMENT
PAIN_FUNCTIONAL_ASSESSMENT: PREVENTS OR INTERFERES WITH ALL ACTIVE AND SOME PASSIVE ACTIVITIES
PAIN_FUNCTIONAL_ASSESSMENT: 0-10

## 2024-12-08 ENCOUNTER — ANESTHESIA (OUTPATIENT)
Dept: OPERATING ROOM | Age: 79
End: 2024-12-08
Payer: MEDICARE

## 2024-12-08 ENCOUNTER — ANESTHESIA EVENT (OUTPATIENT)
Dept: OPERATING ROOM | Age: 79
End: 2024-12-08
Payer: MEDICARE

## 2024-12-08 ENCOUNTER — APPOINTMENT (OUTPATIENT)
Dept: GENERAL RADIOLOGY | Age: 79
DRG: 481 | End: 2024-12-08
Payer: MEDICARE

## 2024-12-08 LAB
ALBUMIN SERPL-MCNC: 3.4 G/DL (ref 3.4–5)
ALBUMIN/GLOB SERPL: 1.1 {RATIO} (ref 1.1–2.2)
ALP SERPL-CCNC: 80 U/L (ref 40–129)
ALT SERPL-CCNC: 15 U/L (ref 10–40)
ANION GAP SERPL CALCULATED.3IONS-SCNC: 10 MMOL/L (ref 9–17)
AST SERPL-CCNC: 32 U/L (ref 15–37)
BASOPHILS # BLD: 0.04 K/UL
BASOPHILS NFR BLD: 1 % (ref 0–1)
BILIRUB SERPL-MCNC: 0.3 MG/DL (ref 0–1)
BUN SERPL-MCNC: 23 MG/DL (ref 7–20)
CALCIUM SERPL-MCNC: 8.7 MG/DL (ref 8.3–10.6)
CHLORIDE SERPL-SCNC: 104 MMOL/L (ref 99–110)
CO2 SERPL-SCNC: 21 MMOL/L (ref 21–32)
CREAT SERPL-MCNC: 1.1 MG/DL (ref 0.6–1.2)
EKG ATRIAL RATE: 80 BPM
EKG DIAGNOSIS: NORMAL
EKG P AXIS: 21 DEGREES
EKG P-R INTERVAL: 104 MS
EKG Q-T INTERVAL: 386 MS
EKG QRS DURATION: 138 MS
EKG QTC CALCULATION (BAZETT): 445 MS
EKG R AXIS: -10 DEGREES
EKG T AXIS: 217 DEGREES
EKG VENTRICULAR RATE: 80 BPM
EOSINOPHIL # BLD: 0.27 K/UL
EOSINOPHILS RELATIVE PERCENT: 3 % (ref 0–3)
ERYTHROCYTE [DISTWIDTH] IN BLOOD BY AUTOMATED COUNT: 13.4 % (ref 11.7–14.9)
GFR, ESTIMATED: 46 ML/MIN/1.73M2
GLUCOSE BLD-MCNC: 191 MG/DL (ref 74–99)
GLUCOSE BLD-MCNC: 216 MG/DL (ref 74–99)
GLUCOSE BLD-MCNC: 218 MG/DL (ref 74–99)
GLUCOSE BLD-MCNC: 248 MG/DL (ref 74–99)
GLUCOSE BLD-MCNC: 267 MG/DL (ref 74–99)
GLUCOSE SERPL-MCNC: 279 MG/DL (ref 74–99)
HCT VFR BLD AUTO: 33.9 % (ref 37–47)
HGB BLD-MCNC: 10.9 G/DL (ref 12.5–16)
IMM GRANULOCYTES # BLD AUTO: 0.02 K/UL
IMM GRANULOCYTES NFR BLD: 0 %
INR PPP: 1
LYMPHOCYTES NFR BLD: 1.57 K/UL
LYMPHOCYTES RELATIVE PERCENT: 19 % (ref 24–44)
MCH RBC QN AUTO: 29.8 PG (ref 27–31)
MCHC RBC AUTO-ENTMCNC: 32.2 G/DL (ref 32–36)
MCV RBC AUTO: 92.6 FL (ref 78–100)
MONOCYTES NFR BLD: 0.77 K/UL
MONOCYTES NFR BLD: 9 % (ref 0–4)
NEUTROPHILS NFR BLD: 68 % (ref 36–66)
NEUTS SEG NFR BLD: 5.75 K/UL
PLATELET # BLD AUTO: 313 K/UL (ref 140–440)
PMV BLD AUTO: 10.2 FL (ref 7.5–11.1)
POTASSIUM SERPL-SCNC: 4.9 MMOL/L (ref 3.5–5.1)
PROT SERPL-MCNC: 6.6 G/DL (ref 6.4–8.2)
PROTHROMBIN TIME: 14 SEC (ref 11.7–14.5)
RBC # BLD AUTO: 3.66 M/UL (ref 4.2–5.4)
SODIUM SERPL-SCNC: 136 MMOL/L (ref 136–145)
T4 FREE SERPL-MCNC: 0.8 NG/DL (ref 0.9–1.8)
TSH SERPL DL<=0.05 MIU/L-ACNC: 5.96 UIU/ML (ref 0.27–4.2)
WBC OTHER # BLD: 8.4 K/UL (ref 4–10.5)

## 2024-12-08 PROCEDURE — 85610 PROTHROMBIN TIME: CPT

## 2024-12-08 PROCEDURE — C1776 JOINT DEVICE (IMPLANTABLE): HCPCS | Performed by: ORTHOPAEDIC SURGERY

## 2024-12-08 PROCEDURE — 3600000014 HC SURGERY LEVEL 4 ADDTL 15MIN: Performed by: ORTHOPAEDIC SURGERY

## 2024-12-08 PROCEDURE — C1713 ANCHOR/SCREW BN/BN,TIS/BN: HCPCS | Performed by: ORTHOPAEDIC SURGERY

## 2024-12-08 PROCEDURE — 2780000010 HC IMPLANT OTHER: Performed by: ORTHOPAEDIC SURGERY

## 2024-12-08 PROCEDURE — 1200000000 HC SEMI PRIVATE

## 2024-12-08 PROCEDURE — 7100000000 HC PACU RECOVERY - FIRST 15 MIN: Performed by: ORTHOPAEDIC SURGERY

## 2024-12-08 PROCEDURE — 3700000000 HC ANESTHESIA ATTENDED CARE: Performed by: ORTHOPAEDIC SURGERY

## 2024-12-08 PROCEDURE — 99223 1ST HOSP IP/OBS HIGH 75: CPT | Performed by: ORTHOPAEDIC SURGERY

## 2024-12-08 PROCEDURE — 2720000010 HC SURG SUPPLY STERILE: Performed by: ORTHOPAEDIC SURGERY

## 2024-12-08 PROCEDURE — 84439 ASSAY OF FREE THYROXINE: CPT

## 2024-12-08 PROCEDURE — 93005 ELECTROCARDIOGRAM TRACING: CPT | Performed by: ANESTHESIOLOGY

## 2024-12-08 PROCEDURE — 84443 ASSAY THYROID STIM HORMONE: CPT

## 2024-12-08 PROCEDURE — 3600000004 HC SURGERY LEVEL 4 BASE: Performed by: ORTHOPAEDIC SURGERY

## 2024-12-08 PROCEDURE — 6360000002 HC RX W HCPCS

## 2024-12-08 PROCEDURE — 2580000003 HC RX 258: Performed by: ORTHOPAEDIC SURGERY

## 2024-12-08 PROCEDURE — 0QS706Z REPOSITION LEFT UPPER FEMUR WITH INTRAMEDULLARY INTERNAL FIXATION DEVICE, OPEN APPROACH: ICD-10-PCS | Performed by: ORTHOPAEDIC SURGERY

## 2024-12-08 PROCEDURE — 85025 COMPLETE CBC W/AUTO DIFF WBC: CPT

## 2024-12-08 PROCEDURE — 2580000003 HC RX 258: Performed by: STUDENT IN AN ORGANIZED HEALTH CARE EDUCATION/TRAINING PROGRAM

## 2024-12-08 PROCEDURE — 3700000001 HC ADD 15 MINUTES (ANESTHESIA): Performed by: ORTHOPAEDIC SURGERY

## 2024-12-08 PROCEDURE — 94761 N-INVAS EAR/PLS OXIMETRY MLT: CPT

## 2024-12-08 PROCEDURE — 6360000002 HC RX W HCPCS: Performed by: ORTHOPAEDIC SURGERY

## 2024-12-08 PROCEDURE — 6370000000 HC RX 637 (ALT 250 FOR IP): Performed by: ORTHOPAEDIC SURGERY

## 2024-12-08 PROCEDURE — 82962 GLUCOSE BLOOD TEST: CPT

## 2024-12-08 PROCEDURE — 2580000003 HC RX 258

## 2024-12-08 PROCEDURE — 93010 ELECTROCARDIOGRAM REPORT: CPT | Performed by: INTERNAL MEDICINE

## 2024-12-08 PROCEDURE — 7100000001 HC PACU RECOVERY - ADDTL 15 MIN: Performed by: ORTHOPAEDIC SURGERY

## 2024-12-08 PROCEDURE — 6370000000 HC RX 637 (ALT 250 FOR IP): Performed by: STUDENT IN AN ORGANIZED HEALTH CARE EDUCATION/TRAINING PROGRAM

## 2024-12-08 PROCEDURE — 2709999900 HC NON-CHARGEABLE SUPPLY: Performed by: ORTHOPAEDIC SURGERY

## 2024-12-08 PROCEDURE — 2700000000 HC OXYGEN THERAPY PER DAY

## 2024-12-08 PROCEDURE — 6360000002 HC RX W HCPCS: Performed by: ANESTHESIOLOGY

## 2024-12-08 PROCEDURE — 36415 COLL VENOUS BLD VENIPUNCTURE: CPT

## 2024-12-08 PROCEDURE — 2500000003 HC RX 250 WO HCPCS

## 2024-12-08 PROCEDURE — C1769 GUIDE WIRE: HCPCS | Performed by: ORTHOPAEDIC SURGERY

## 2024-12-08 PROCEDURE — 76000 FLUOROSCOPY <1 HR PHYS/QHP: CPT

## 2024-12-08 PROCEDURE — 27245 TREAT THIGH FRACTURE: CPT | Performed by: ORTHOPAEDIC SURGERY

## 2024-12-08 PROCEDURE — 80053 COMPREHEN METABOLIC PANEL: CPT

## 2024-12-08 DEVICE — SCREW LK F/IM NAIL 5X34MM XL25 ST: Type: IMPLANTABLE DEVICE | Site: FEMUR | Status: FUNCTIONAL

## 2024-12-08 DEVICE — IMPLANTABLE DEVICE: Type: IMPLANTABLE DEVICE | Site: FEMUR | Status: FUNCTIONAL

## 2024-12-08 DEVICE — CABLE SURG DIA1.7MM S STL HA CERCLAGE W/ CRMP 29880101S] DEPUY SYNTHES USA]: Type: IMPLANTABLE DEVICE | Site: FEMUR | Status: FUNCTIONAL

## 2024-12-08 DEVICE — SCREW LK F/IM NAIL 5X32MM XL25 ST: Type: IMPLANTABLE DEVICE | Site: FEMUR | Status: FUNCTIONAL

## 2024-12-08 RX ORDER — SODIUM CHLORIDE, SODIUM LACTATE, POTASSIUM CHLORIDE, CALCIUM CHLORIDE 600; 310; 30; 20 MG/100ML; MG/100ML; MG/100ML; MG/100ML
INJECTION, SOLUTION INTRAVENOUS
Status: DISCONTINUED | OUTPATIENT
Start: 2024-12-08 | End: 2024-12-08 | Stop reason: SDUPTHER

## 2024-12-08 RX ORDER — SODIUM CHLORIDE 0.9 % (FLUSH) 0.9 %
5-40 SYRINGE (ML) INJECTION PRN
Status: DISCONTINUED | OUTPATIENT
Start: 2024-12-08 | End: 2024-12-08 | Stop reason: HOSPADM

## 2024-12-08 RX ORDER — LABETALOL HYDROCHLORIDE 5 MG/ML
10 INJECTION, SOLUTION INTRAVENOUS
Status: DISCONTINUED | OUTPATIENT
Start: 2024-12-08 | End: 2024-12-08 | Stop reason: HOSPADM

## 2024-12-08 RX ORDER — ACETAMINOPHEN 325 MG/1
650 TABLET ORAL
Status: DISCONTINUED | OUTPATIENT
Start: 2024-12-08 | End: 2024-12-08 | Stop reason: HOSPADM

## 2024-12-08 RX ORDER — NALOXONE HYDROCHLORIDE 0.4 MG/ML
INJECTION, SOLUTION INTRAMUSCULAR; INTRAVENOUS; SUBCUTANEOUS PRN
Status: DISCONTINUED | OUTPATIENT
Start: 2024-12-08 | End: 2024-12-08 | Stop reason: HOSPADM

## 2024-12-08 RX ORDER — SODIUM CHLORIDE 0.9 % (FLUSH) 0.9 %
5-40 SYRINGE (ML) INJECTION EVERY 12 HOURS SCHEDULED
Status: DISCONTINUED | OUTPATIENT
Start: 2024-12-08 | End: 2024-12-08 | Stop reason: HOSPADM

## 2024-12-08 RX ORDER — LIDOCAINE HYDROCHLORIDE 20 MG/ML
INJECTION, SOLUTION INTRAVENOUS
Status: DISCONTINUED | OUTPATIENT
Start: 2024-12-08 | End: 2024-12-08 | Stop reason: SDUPTHER

## 2024-12-08 RX ORDER — ONDANSETRON 4 MG/1
4 TABLET, ORALLY DISINTEGRATING ORAL EVERY 8 HOURS PRN
Status: DISCONTINUED | OUTPATIENT
Start: 2024-12-08 | End: 2024-12-12 | Stop reason: HOSPADM

## 2024-12-08 RX ORDER — ENOXAPARIN SODIUM 100 MG/ML
40 INJECTION SUBCUTANEOUS DAILY
Status: DISCONTINUED | OUTPATIENT
Start: 2024-12-09 | End: 2024-12-12 | Stop reason: HOSPADM

## 2024-12-08 RX ORDER — ROCURONIUM BROMIDE 10 MG/ML
INJECTION, SOLUTION INTRAVENOUS
Status: DISCONTINUED | OUTPATIENT
Start: 2024-12-08 | End: 2024-12-08 | Stop reason: SDUPTHER

## 2024-12-08 RX ORDER — SODIUM CHLORIDE 9 MG/ML
INJECTION, SOLUTION INTRAVENOUS PRN
Status: DISCONTINUED | OUTPATIENT
Start: 2024-12-08 | End: 2024-12-08 | Stop reason: HOSPADM

## 2024-12-08 RX ORDER — PHENYLEPHRINE HCL IN 0.9% NACL 1 MG/10 ML
SYRINGE (ML) INTRAVENOUS
Status: DISCONTINUED | OUTPATIENT
Start: 2024-12-08 | End: 2024-12-08 | Stop reason: SDUPTHER

## 2024-12-08 RX ORDER — ONDANSETRON 2 MG/ML
4 INJECTION INTRAMUSCULAR; INTRAVENOUS
Status: DISCONTINUED | OUTPATIENT
Start: 2024-12-08 | End: 2024-12-08 | Stop reason: HOSPADM

## 2024-12-08 RX ORDER — ONDANSETRON 2 MG/ML
INJECTION INTRAMUSCULAR; INTRAVENOUS
Status: DISCONTINUED | OUTPATIENT
Start: 2024-12-08 | End: 2024-12-08 | Stop reason: SDUPTHER

## 2024-12-08 RX ORDER — ONDANSETRON 2 MG/ML
4 INJECTION INTRAMUSCULAR; INTRAVENOUS EVERY 6 HOURS PRN
Status: DISCONTINUED | OUTPATIENT
Start: 2024-12-08 | End: 2024-12-12 | Stop reason: HOSPADM

## 2024-12-08 RX ORDER — PROPOFOL 10 MG/ML
INJECTION, EMULSION INTRAVENOUS
Status: DISCONTINUED | OUTPATIENT
Start: 2024-12-08 | End: 2024-12-08 | Stop reason: SDUPTHER

## 2024-12-08 RX ORDER — SODIUM CHLORIDE, SODIUM LACTATE, POTASSIUM CHLORIDE, CALCIUM CHLORIDE 600; 310; 30; 20 MG/100ML; MG/100ML; MG/100ML; MG/100ML
INJECTION, SOLUTION INTRAVENOUS CONTINUOUS
Status: DISCONTINUED | OUTPATIENT
Start: 2024-12-08 | End: 2024-12-08 | Stop reason: HOSPADM

## 2024-12-08 RX ORDER — DEXAMETHASONE SODIUM PHOSPHATE 4 MG/ML
INJECTION, SOLUTION INTRA-ARTICULAR; INTRALESIONAL; INTRAMUSCULAR; INTRAVENOUS; SOFT TISSUE
Status: DISCONTINUED | OUTPATIENT
Start: 2024-12-08 | End: 2024-12-08 | Stop reason: SDUPTHER

## 2024-12-08 RX ORDER — OXYCODONE AND ACETAMINOPHEN 5; 325 MG/1; MG/1
1 TABLET ORAL EVERY 4 HOURS PRN
Status: DISCONTINUED | OUTPATIENT
Start: 2024-12-08 | End: 2024-12-12 | Stop reason: HOSPADM

## 2024-12-08 RX ORDER — HYDROMORPHONE HYDROCHLORIDE 1 MG/ML
0.25 INJECTION, SOLUTION INTRAMUSCULAR; INTRAVENOUS; SUBCUTANEOUS
Status: DISCONTINUED | OUTPATIENT
Start: 2024-12-08 | End: 2024-12-12 | Stop reason: HOSPADM

## 2024-12-08 RX ORDER — HYDROMORPHONE HYDROCHLORIDE 1 MG/ML
0.5 INJECTION, SOLUTION INTRAMUSCULAR; INTRAVENOUS; SUBCUTANEOUS
Status: DISCONTINUED | OUTPATIENT
Start: 2024-12-08 | End: 2024-12-12 | Stop reason: HOSPADM

## 2024-12-08 RX ORDER — METOCLOPRAMIDE HYDROCHLORIDE 5 MG/ML
10 INJECTION INTRAMUSCULAR; INTRAVENOUS
Status: DISCONTINUED | OUTPATIENT
Start: 2024-12-08 | End: 2024-12-08 | Stop reason: HOSPADM

## 2024-12-08 RX ORDER — DIPHENHYDRAMINE HYDROCHLORIDE 50 MG/ML
12.5 INJECTION INTRAMUSCULAR; INTRAVENOUS
Status: DISCONTINUED | OUTPATIENT
Start: 2024-12-08 | End: 2024-12-08 | Stop reason: HOSPADM

## 2024-12-08 RX ORDER — FENTANYL CITRATE 50 UG/ML
INJECTION, SOLUTION INTRAMUSCULAR; INTRAVENOUS
Status: DISCONTINUED | OUTPATIENT
Start: 2024-12-08 | End: 2024-12-08 | Stop reason: SDUPTHER

## 2024-12-08 RX ADMIN — Medication 0.2 MG: at 13:50

## 2024-12-08 RX ADMIN — INSULIN LISPRO 2 UNITS: 100 INJECTION, SOLUTION INTRAVENOUS; SUBCUTANEOUS at 22:25

## 2024-12-08 RX ADMIN — Medication 0.2 MG: at 13:47

## 2024-12-08 RX ADMIN — Medication 0.2 MG: at 14:38

## 2024-12-08 RX ADMIN — ONDANSETRON 4 MG: 2 INJECTION INTRAMUSCULAR; INTRAVENOUS at 13:39

## 2024-12-08 RX ADMIN — Medication 0.1 MG: at 14:29

## 2024-12-08 RX ADMIN — CEFAZOLIN 2000 MG: 2 INJECTION, POWDER, FOR SOLUTION INTRAMUSCULAR; INTRAVENOUS at 13:42

## 2024-12-08 RX ADMIN — ACETAMINOPHEN 650 MG: 325 TABLET ORAL at 17:31

## 2024-12-08 RX ADMIN — METHOCARBAMOL 750 MG: 500 TABLET ORAL at 03:29

## 2024-12-08 RX ADMIN — DEXAMETHASONE SODIUM PHOSPHATE 8 MG: 4 INJECTION, SOLUTION INTRAMUSCULAR; INTRAVENOUS at 13:39

## 2024-12-08 RX ADMIN — FENTANYL CITRATE 50 MCG: 50 INJECTION, SOLUTION INTRAMUSCULAR; INTRAVENOUS at 13:18

## 2024-12-08 RX ADMIN — SODIUM CHLORIDE, POTASSIUM CHLORIDE, SODIUM LACTATE AND CALCIUM CHLORIDE: 600; 310; 30; 20 INJECTION, SOLUTION INTRAVENOUS at 13:18

## 2024-12-08 RX ADMIN — Medication 0.3 MG: at 13:28

## 2024-12-08 RX ADMIN — FENTANYL CITRATE 50 MCG: 50 INJECTION, SOLUTION INTRAMUSCULAR; INTRAVENOUS at 13:21

## 2024-12-08 RX ADMIN — SODIUM CHLORIDE, POTASSIUM CHLORIDE, SODIUM LACTATE AND CALCIUM CHLORIDE: 600; 310; 30; 20 INJECTION, SOLUTION INTRAVENOUS at 14:52

## 2024-12-08 RX ADMIN — INSULIN LISPRO 1 UNITS: 100 INJECTION, SOLUTION INTRAVENOUS; SUBCUTANEOUS at 17:32

## 2024-12-08 RX ADMIN — TROSPIUM CHLORIDE 20 MG: 20 TABLET, FILM COATED ORAL at 22:25

## 2024-12-08 RX ADMIN — OXYCODONE HYDROCHLORIDE 10 MG: 10 TABLET ORAL at 02:55

## 2024-12-08 RX ADMIN — CARVEDILOL 6.25 MG: 6.25 TABLET, FILM COATED ORAL at 17:32

## 2024-12-08 RX ADMIN — SUGAMMADEX 200 MG: 100 INJECTION, SOLUTION INTRAVENOUS at 14:57

## 2024-12-08 RX ADMIN — HYDROMORPHONE HYDROCHLORIDE 0.5 MG: 1 INJECTION, SOLUTION INTRAMUSCULAR; INTRAVENOUS; SUBCUTANEOUS at 13:03

## 2024-12-08 RX ADMIN — SODIUM CHLORIDE, PRESERVATIVE FREE 10 ML: 5 INJECTION INTRAVENOUS at 22:57

## 2024-12-08 RX ADMIN — WATER 2000 MG: 1 INJECTION INTRAMUSCULAR; INTRAVENOUS; SUBCUTANEOUS at 09:36

## 2024-12-08 RX ADMIN — SODIUM CHLORIDE, PRESERVATIVE FREE 10 ML: 5 INJECTION INTRAVENOUS at 09:37

## 2024-12-08 RX ADMIN — HYDROMORPHONE HYDROCHLORIDE 0.5 MG: 1 INJECTION, SOLUTION INTRAMUSCULAR; INTRAVENOUS; SUBCUTANEOUS at 09:46

## 2024-12-08 RX ADMIN — PROPOFOL 100 MG: 10 INJECTION, EMULSION INTRAVENOUS at 13:23

## 2024-12-08 RX ADMIN — HYDROMORPHONE HYDROCHLORIDE 0.5 MG: 1 INJECTION, SOLUTION INTRAMUSCULAR; INTRAVENOUS; SUBCUTANEOUS at 15:31

## 2024-12-08 RX ADMIN — WATER 2000 MG: 1 INJECTION INTRAMUSCULAR; INTRAVENOUS; SUBCUTANEOUS at 22:25

## 2024-12-08 RX ADMIN — Medication 0.2 MG: at 15:02

## 2024-12-08 RX ADMIN — HYDROMORPHONE HYDROCHLORIDE 0.5 MG: 1 INJECTION, SOLUTION INTRAMUSCULAR; INTRAVENOUS; SUBCUTANEOUS at 17:32

## 2024-12-08 RX ADMIN — FENTANYL CITRATE 50 MCG: 50 INJECTION, SOLUTION INTRAMUSCULAR; INTRAVENOUS at 14:23

## 2024-12-08 RX ADMIN — LIDOCAINE HYDROCHLORIDE 60 MG: 20 INJECTION, SOLUTION INTRAVENOUS at 13:23

## 2024-12-08 RX ADMIN — Medication 0.1 MG: at 14:06

## 2024-12-08 RX ADMIN — OXYCODONE HYDROCHLORIDE AND ACETAMINOPHEN 1 TABLET: 5; 325 TABLET ORAL at 22:56

## 2024-12-08 RX ADMIN — GABAPENTIN 200 MG: 100 CAPSULE ORAL at 22:25

## 2024-12-08 RX ADMIN — INSULIN LISPRO 1 UNITS: 100 INJECTION, SOLUTION INTRAVENOUS; SUBCUTANEOUS at 09:36

## 2024-12-08 RX ADMIN — Medication 0.1 MG: at 14:33

## 2024-12-08 RX ADMIN — FENTANYL CITRATE 50 MCG: 50 INJECTION, SOLUTION INTRAMUSCULAR; INTRAVENOUS at 14:00

## 2024-12-08 RX ADMIN — ROCURONIUM BROMIDE 50 MG: 10 INJECTION, SOLUTION INTRAVENOUS at 13:23

## 2024-12-08 ASSESSMENT — PAIN DESCRIPTION - ORIENTATION
ORIENTATION: LEFT

## 2024-12-08 ASSESSMENT — PAIN DESCRIPTION - PAIN TYPE
TYPE: SURGICAL PAIN

## 2024-12-08 ASSESSMENT — PAIN SCALES - GENERAL
PAINLEVEL_OUTOF10: 10
PAINLEVEL_OUTOF10: 5
PAINLEVEL_OUTOF10: 9
PAINLEVEL_OUTOF10: 3
PAINLEVEL_OUTOF10: 10
PAINLEVEL_OUTOF10: 5
PAINLEVEL_OUTOF10: 10
PAINLEVEL_OUTOF10: 9

## 2024-12-08 ASSESSMENT — PAIN DESCRIPTION - DESCRIPTORS
DESCRIPTORS: ACHING
DESCRIPTORS: ACHING;THROBBING
DESCRIPTORS: ACHING
DESCRIPTORS: ACHING;OTHER (COMMENT)
DESCRIPTORS: ACHING

## 2024-12-08 ASSESSMENT — PAIN - FUNCTIONAL ASSESSMENT
PAIN_FUNCTIONAL_ASSESSMENT: PREVENTS OR INTERFERES SOME ACTIVE ACTIVITIES AND ADLS
PAIN_FUNCTIONAL_ASSESSMENT: 0-10
PAIN_FUNCTIONAL_ASSESSMENT: PREVENTS OR INTERFERES SOME ACTIVE ACTIVITIES AND ADLS
PAIN_FUNCTIONAL_ASSESSMENT: PREVENTS OR INTERFERES WITH MANY ACTIVE NOT PASSIVE ACTIVITIES
PAIN_FUNCTIONAL_ASSESSMENT: PREVENTS OR INTERFERES SOME ACTIVE ACTIVITIES AND ADLS
PAIN_FUNCTIONAL_ASSESSMENT: INTOLERABLE, UNABLE TO DO ANY ACTIVE OR PASSIVE ACTIVITIES
PAIN_FUNCTIONAL_ASSESSMENT: PREVENTS OR INTERFERES SOME ACTIVE ACTIVITIES AND ADLS
PAIN_FUNCTIONAL_ASSESSMENT: PREVENTS OR INTERFERES SOME ACTIVE ACTIVITIES AND ADLS

## 2024-12-08 ASSESSMENT — PAIN DESCRIPTION - LOCATION
LOCATION: HIP
LOCATION: LEG
LOCATION: HIP

## 2024-12-08 ASSESSMENT — PAIN SCALES - WONG BAKER
WONGBAKER_NUMERICALRESPONSE: NO HURT
WONGBAKER_NUMERICALRESPONSE: HURTS LITTLE MORE
WONGBAKER_NUMERICALRESPONSE: NO HURT

## 2024-12-08 ASSESSMENT — PAIN DESCRIPTION - ONSET
ONSET: ON-GOING
ONSET: ON-GOING

## 2024-12-08 ASSESSMENT — PAIN DESCRIPTION - FREQUENCY
FREQUENCY: CONTINUOUS
FREQUENCY: CONTINUOUS

## 2024-12-08 NOTE — ED TRIAGE NOTES
Pt to the ED via EMS with c/o falling while trying to get into the shower. Pt's bath mat slipped and she was unable to catch herself with her arm bar. Family was home and was able to help her right away. Pt has an injury to her left hip- rotation and shortening noted by EMS. Pt was given 75mcg of fentanyl by EMS with no relief to pt's pain. Pt is rating her pain an 8/10.

## 2024-12-08 NOTE — ED NOTES
(!) 89%   Weight:       Height:           PO Status: Nothing by Mouth    O2 Flow Rate: O2 Device: Nasal cannula O2 Flow Rate (L/min): 2 L/min    Cardiac Rhythm:     Last documented pain medication administered: See MAR    NIH Score: NIH       Active LDA's:   Peripheral IV 12/07/24 Left Antecubital (Active)   Site Assessment Clean, dry & intact 12/07/24 2011   Line Status Blood return noted 12/07/24 2011   Phlebitis Assessment No symptoms 12/07/24 2011   Infiltration Assessment 0 12/07/24 2011   Dressing Status New dressing applied;Clean, dry & intact 12/07/24 2011   Dressing Type Transparent 12/07/24 2011   Dressing Intervention New 12/07/24 2011       Pertinent or High Risk Medications/Drips: no   If Yes, please provide details:       Blood Product Administration: no  If Yes, please provide details:     Recommendation    Incomplete orders Inpt  Additional Comments:    If any further questions, please call Sending RN at [phone]    Electronically signed by: Electronically signed by Zuri Pickering RN on 12/7/2024 at 10:10 PM

## 2024-12-08 NOTE — ED PROVIDER NOTES
Emergency Department Encounter    Patient: Kristyn Rascon  MRN: [id number]  : 1945  Date of Evaluation: 2024  ED Provider:  Ha Engle MD    Triage Chief Complaint:   Fall (Left hip rotation and shortening)    [place]:  Kristyn Rascon is a 79 y.o. female history of a CVA, type 2 diabetes, hypertension, L1 compression fracture with chronic leg pain that presents emergency department via ambulance from home after a fall.  Patient states she was getting into the shower when she slipped.  Since then she is having severe left hip pain with her hip medially rotated.  Patient received some sort of pain medication and she states the pain is very minimal at this point as long as she is not moving the extremity.  She does not admit to numbness or tingling and denies headache, neck, back, chest or abdominal pains.  Patient does not admit to shortness of breath or visual changes or focal weakness.    ROS - see HPI, below listed is current ROS at time of my eval:  General:  No fevers, no chills, no weakness  Eyes:  No recent vison changes, no discharge  ENT:  No sore throat, no nasal congestion, no hearing changes  Cardiovascular:  No chest pain, no palpitations  Respiratory:  No shortness of breath, no cough, no wheezing  Gastrointestinal:  No pain, no nausea, no vomiting, no diarrhea  Musculoskeletal:  No muscle pain, no joint pain  Skin:  No rash, no pruritis, no easy bruising  Neurologic:  No speech problems, no headache, no extremity numbness, no extremity tingling, no extremity weakness  Psychiatric:  No anxiety  Genitourinary:  No dysuria, no hematuria  Endocrine:  No unexpected weight gain, no unexpected weight loss  Extremities: Left hip pain with medial rotation of the extremity    Past Medical History:   Diagnosis Date    Cerebral artery occlusion with cerebral infarction (HCC)     Diabetes mellitus (HCC)     Hypertension     L1 vertebral fracture (HCC)      Past Surgical History:   Procedure Laterality

## 2024-12-08 NOTE — H&P
History and Physical      Name:  Kristyn Rascon /Age/Sex: 1945  (79 y.o. female)   MRN & CSN:  [id number] & [id number] Encounter Date/Time: 2024 9:56 PM   Location:  ED16/ED-16 PCP: Elle Hidalgo APRN - NP       Hospital Day: 1    Assessment and Plan:     Patient is a 79-year-old female who presented with fall.     # Closed left hip fracture  - Endorsed fall while getting into the shower and slipping leading to left hip pain. No head trauma or LOC. Not on anticoagulants. Ambulates with walker, no recent falls.  - NVI distally. XR showed acute comminuted subtrochanteric spiral fracture of left femur.  - ED provider discussed with Ortho, keep NPO past midnight. Supportive care.    # Age-related osteoporosis  - No previous osteoporosis evaluation.  - Clinically has pathological fracture secondary to osteoporosis.  - Started Ca and VD3, will benefit from osteoporosis evaluation in a few weeks outpatient.    # Essential hypertension  - Elevated secondary to above.  - Continue Coreg, optimize pain management first.    # Suspected SKIP  - STOP-BANG high. No previous PSG.  - Requiring 2L NC while asleep, refused trial of CPAP.    # T2DM with hyperglycemia and peripheral neuropathy  - No A1c on file, ordered.    - Held metformin.  - LCSI. Continue gabapentin.    # CKD3a  - Labs overall stable, avoid nephrotoxic medications.     # Acquired hypothyroidism  - Continue Synthroid.  - Follow-up repeat TSH.    # OAB  - Continue Sanctura (for Vesicare).     # Class I obesity  - BMI 31.3.    Checklist:  Advanced care planning: full  Diet: NPO past midnight pending surgical evaluation  DVT ppx: Lovenox  Sugar: BG goal of 140-180 while inpatient    Disposition: admit to inpatient.  Estimated discharge: 3-4 day(s).  Current living situation: home.  Expected disposition: home.    Spoke with ED provider who recommended admission for the patient and I agree with that plan.  Personally reviewed lab studies and

## 2024-12-08 NOTE — OP NOTE
into position up to a size 11-1/2 reamer for a 10 mm diameter nail and C-arm was utilized to confirm excellent reduction through the reaming process.    Next the Synthes TFN measurements 320 mm length 10 mm diameter 125 degree neck length of old nail was impacted in place and seated well.  The target device was detached and then a guidewire was advanced into the femoral neck and head on the central position AP and lateral views.  A 85 mm helical blade was selected and the guidewire was overdrilled and then the helical blade was impacted in place and seated well.    Final x-rays taken proximally confirming excellent position of the helical blade and nail and anatomic alignment through the fracture site.    Next distal interlocking screws were placed 1 in the static: 1 in the oblique ALT using perfect [place] technique and appropriately length screws were placed percutaneously confirmed in their alignment with the C arm.    The wounds were thoroughly irrigated.  The proximal incision was closed with interrupted 0 Vicryl sutures in the deep fascial layer and a running 10 strata fix suture in the fascial layer and deep subcutaneous regions in the incision overlying the fracture site.  Next subcutaneous layers were closed with buried 2-0 Vicryl and skin was closed with staples.  Sterile dressings were applied to the Aquacel dressings.  Patient was extubated awakened and taken to PACU in stable condition.  All sponge and needle counts were correct.    Postoperative plan:  Patient will be allowed to perform weightbearing as tolerated be on DVT prophylaxis continue medical management per hospitalist service    Electronically signed by Christopher Sanabria MD on 12/8/2024 at 3:03 PM

## 2024-12-09 LAB
ANION GAP SERPL CALCULATED.3IONS-SCNC: 11 MMOL/L (ref 9–17)
BASOPHILS # BLD: 0.01 K/UL
BASOPHILS NFR BLD: 0 % (ref 0–1)
BUN SERPL-MCNC: 27 MG/DL (ref 7–20)
CALCIUM SERPL-MCNC: 8 MG/DL (ref 8.3–10.6)
CHLORIDE SERPL-SCNC: 100 MMOL/L (ref 99–110)
CO2 SERPL-SCNC: 21 MMOL/L (ref 21–32)
CREAT SERPL-MCNC: 1.2 MG/DL (ref 0.6–1.2)
EOSINOPHIL # BLD: 0 K/UL
EOSINOPHILS RELATIVE PERCENT: 0 % (ref 0–3)
ERYTHROCYTE [DISTWIDTH] IN BLOOD BY AUTOMATED COUNT: 13.6 % (ref 11.7–14.9)
GFR, ESTIMATED: 45 ML/MIN/1.73M2
GLUCOSE BLD-MCNC: 268 MG/DL (ref 74–99)
GLUCOSE BLD-MCNC: 311 MG/DL (ref 74–99)
GLUCOSE BLD-MCNC: 394 MG/DL (ref 74–99)
GLUCOSE SERPL-MCNC: 284 MG/DL (ref 74–99)
HCT VFR BLD AUTO: 26.6 % (ref 37–47)
HGB BLD-MCNC: 8.2 G/DL (ref 12.5–16)
IMM GRANULOCYTES # BLD AUTO: 0.03 K/UL
IMM GRANULOCYTES NFR BLD: 0 %
LYMPHOCYTES NFR BLD: 1.17 K/UL
LYMPHOCYTES RELATIVE PERCENT: 14 % (ref 24–44)
MCH RBC QN AUTO: 29.4 PG (ref 27–31)
MCHC RBC AUTO-ENTMCNC: 30.8 G/DL (ref 32–36)
MCV RBC AUTO: 95.3 FL (ref 78–100)
MONOCYTES NFR BLD: 0.82 K/UL
MONOCYTES NFR BLD: 9 % (ref 0–4)
NEUTROPHILS NFR BLD: 77 % (ref 36–66)
NEUTS SEG NFR BLD: 6.66 K/UL
PLATELET # BLD AUTO: 262 K/UL (ref 140–440)
PMV BLD AUTO: 10.4 FL (ref 7.5–11.1)
POTASSIUM SERPL-SCNC: 5.1 MMOL/L (ref 3.5–5.1)
RBC # BLD AUTO: 2.79 M/UL (ref 4.2–5.4)
SODIUM SERPL-SCNC: 131 MMOL/L (ref 136–145)
WBC OTHER # BLD: 8.7 K/UL (ref 4–10.5)

## 2024-12-09 PROCEDURE — 6370000000 HC RX 637 (ALT 250 FOR IP): Performed by: ORTHOPAEDIC SURGERY

## 2024-12-09 PROCEDURE — 85025 COMPLETE CBC W/AUTO DIFF WBC: CPT

## 2024-12-09 PROCEDURE — 94761 N-INVAS EAR/PLS OXIMETRY MLT: CPT

## 2024-12-09 PROCEDURE — 97530 THERAPEUTIC ACTIVITIES: CPT

## 2024-12-09 PROCEDURE — 2500000003 HC RX 250 WO HCPCS: Performed by: STUDENT IN AN ORGANIZED HEALTH CARE EDUCATION/TRAINING PROGRAM

## 2024-12-09 PROCEDURE — 97162 PT EVAL MOD COMPLEX 30 MIN: CPT

## 2024-12-09 PROCEDURE — 6360000002 HC RX W HCPCS: Performed by: ORTHOPAEDIC SURGERY

## 2024-12-09 PROCEDURE — 82962 GLUCOSE BLOOD TEST: CPT

## 2024-12-09 PROCEDURE — 97166 OT EVAL MOD COMPLEX 45 MIN: CPT

## 2024-12-09 PROCEDURE — 97535 SELF CARE MNGMENT TRAINING: CPT

## 2024-12-09 PROCEDURE — 2580000003 HC RX 258: Performed by: ORTHOPAEDIC SURGERY

## 2024-12-09 PROCEDURE — 83036 HEMOGLOBIN GLYCOSYLATED A1C: CPT

## 2024-12-09 PROCEDURE — 2580000003 HC RX 258: Performed by: STUDENT IN AN ORGANIZED HEALTH CARE EDUCATION/TRAINING PROGRAM

## 2024-12-09 PROCEDURE — 80048 BASIC METABOLIC PNL TOTAL CA: CPT

## 2024-12-09 PROCEDURE — 6370000000 HC RX 637 (ALT 250 FOR IP): Performed by: STUDENT IN AN ORGANIZED HEALTH CARE EDUCATION/TRAINING PROGRAM

## 2024-12-09 PROCEDURE — 1200000000 HC SEMI PRIVATE

## 2024-12-09 PROCEDURE — 94664 DEMO&/EVAL PT USE INHALER: CPT

## 2024-12-09 PROCEDURE — 94150 VITAL CAPACITY TEST: CPT

## 2024-12-09 PROCEDURE — 36415 COLL VENOUS BLD VENIPUNCTURE: CPT

## 2024-12-09 RX ORDER — SODIUM CHLORIDE 9 MG/ML
INJECTION, SOLUTION INTRAVENOUS CONTINUOUS
Status: DISCONTINUED | OUTPATIENT
Start: 2024-12-09 | End: 2024-12-12 | Stop reason: HOSPADM

## 2024-12-09 RX ORDER — INSULIN GLARGINE 100 [IU]/ML
10 INJECTION, SOLUTION SUBCUTANEOUS NIGHTLY
Status: DISCONTINUED | OUTPATIENT
Start: 2024-12-09 | End: 2024-12-12 | Stop reason: HOSPADM

## 2024-12-09 RX ORDER — 0.9 % SODIUM CHLORIDE 0.9 %
500 INTRAVENOUS SOLUTION INTRAVENOUS ONCE
Status: COMPLETED | OUTPATIENT
Start: 2024-12-09 | End: 2024-12-09

## 2024-12-09 RX ORDER — INSULIN LISPRO 100 [IU]/ML
0-8 INJECTION, SOLUTION INTRAVENOUS; SUBCUTANEOUS
Status: DISCONTINUED | OUTPATIENT
Start: 2024-12-09 | End: 2024-12-12 | Stop reason: HOSPADM

## 2024-12-09 RX ADMIN — OXYCODONE HYDROCHLORIDE AND ACETAMINOPHEN 1 TABLET: 5; 325 TABLET ORAL at 14:16

## 2024-12-09 RX ADMIN — SODIUM CHLORIDE 500 ML: 9 INJECTION, SOLUTION INTRAVENOUS at 08:23

## 2024-12-09 RX ADMIN — SODIUM CHLORIDE, PRESERVATIVE FREE 10 ML: 5 INJECTION INTRAVENOUS at 08:14

## 2024-12-09 RX ADMIN — INSULIN LISPRO 2 UNITS: 100 INJECTION, SOLUTION INTRAVENOUS; SUBCUTANEOUS at 08:37

## 2024-12-09 RX ADMIN — Medication 2 TABLET: at 12:28

## 2024-12-09 RX ADMIN — HYDROMORPHONE HYDROCHLORIDE 0.5 MG: 1 INJECTION, SOLUTION INTRAMUSCULAR; INTRAVENOUS; SUBCUTANEOUS at 16:35

## 2024-12-09 RX ADMIN — OXYCODONE HYDROCHLORIDE AND ACETAMINOPHEN 1 TABLET: 5; 325 TABLET ORAL at 04:00

## 2024-12-09 RX ADMIN — MICONAZOLE NITRATE: 2 POWDER TOPICAL at 23:19

## 2024-12-09 RX ADMIN — GABAPENTIN 200 MG: 100 CAPSULE ORAL at 08:13

## 2024-12-09 RX ADMIN — MICONAZOLE NITRATE: 2 POWDER TOPICAL at 14:17

## 2024-12-09 RX ADMIN — HYDROMORPHONE HYDROCHLORIDE 0.25 MG: 1 INJECTION, SOLUTION INTRAMUSCULAR; INTRAVENOUS; SUBCUTANEOUS at 10:04

## 2024-12-09 RX ADMIN — INSULIN LISPRO 6 UNITS: 100 INJECTION, SOLUTION INTRAVENOUS; SUBCUTANEOUS at 17:34

## 2024-12-09 RX ADMIN — ACETAMINOPHEN 650 MG: 325 TABLET ORAL at 17:34

## 2024-12-09 RX ADMIN — WATER 2000 MG: 1 INJECTION INTRAMUSCULAR; INTRAVENOUS; SUBCUTANEOUS at 04:57

## 2024-12-09 RX ADMIN — LEVOTHYROXINE SODIUM 88 MCG: 0.09 TABLET ORAL at 08:13

## 2024-12-09 RX ADMIN — SODIUM CHLORIDE: 9 INJECTION, SOLUTION INTRAVENOUS at 14:30

## 2024-12-09 RX ADMIN — ENOXAPARIN SODIUM 40 MG: 100 INJECTION SUBCUTANEOUS at 08:13

## 2024-12-09 RX ADMIN — INSULIN LISPRO 8 UNITS: 100 INJECTION, SOLUTION INTRAVENOUS; SUBCUTANEOUS at 12:37

## 2024-12-09 RX ADMIN — WATER 2000 MG: 1 INJECTION INTRAMUSCULAR; INTRAVENOUS; SUBCUTANEOUS at 12:27

## 2024-12-09 RX ADMIN — OXYCODONE HYDROCHLORIDE AND ACETAMINOPHEN 1 TABLET: 5; 325 TABLET ORAL at 08:30

## 2024-12-09 RX ADMIN — ACETAMINOPHEN 650 MG: 325 TABLET ORAL at 12:27

## 2024-12-09 ASSESSMENT — PAIN DESCRIPTION - ORIENTATION
ORIENTATION: LEFT

## 2024-12-09 ASSESSMENT — PAIN SCALES - GENERAL
PAINLEVEL_OUTOF10: 1
PAINLEVEL_OUTOF10: 6
PAINLEVEL_OUTOF10: 2
PAINLEVEL_OUTOF10: 8
PAINLEVEL_OUTOF10: 8
PAINLEVEL_OUTOF10: 9
PAINLEVEL_OUTOF10: 2
PAINLEVEL_OUTOF10: 7
PAINLEVEL_OUTOF10: 8
PAINLEVEL_OUTOF10: 2
PAINLEVEL_OUTOF10: 4
PAINLEVEL_OUTOF10: 2

## 2024-12-09 ASSESSMENT — PAIN DESCRIPTION - LOCATION
LOCATION: LEG
LOCATION: LEG
LOCATION: HIP
LOCATION: LEG

## 2024-12-09 ASSESSMENT — PAIN DESCRIPTION - DESCRIPTORS
DESCRIPTORS: ACHING;DISCOMFORT
DESCRIPTORS: STABBING;SHOOTING
DESCRIPTORS: THROBBING
DESCRIPTORS: DISCOMFORT
DESCRIPTORS: ACHING;DISCOMFORT
DESCRIPTORS: ACHING;DISCOMFORT

## 2024-12-09 ASSESSMENT — PAIN - FUNCTIONAL ASSESSMENT
PAIN_FUNCTIONAL_ASSESSMENT: PREVENTS OR INTERFERES SOME ACTIVE ACTIVITIES AND ADLS
PAIN_FUNCTIONAL_ASSESSMENT: PREVENTS OR INTERFERES WITH MANY ACTIVE NOT PASSIVE ACTIVITIES
PAIN_FUNCTIONAL_ASSESSMENT: PREVENTS OR INTERFERES SOME ACTIVE ACTIVITIES AND ADLS

## 2024-12-09 ASSESSMENT — PAIN SCALES - WONG BAKER: WONGBAKER_NUMERICALRESPONSE: NO HURT

## 2024-12-09 ASSESSMENT — PAIN DESCRIPTION - PAIN TYPE
TYPE: SURGICAL PAIN
TYPE: SURGICAL PAIN

## 2024-12-09 NOTE — PLAN OF CARE
Problem: Chronic Conditions and Co-morbidities  Goal: Patient's chronic conditions and co-morbidity symptoms are monitored and maintained or improved  12/9/2024 1130 by Jennifer Segovia RN  Outcome: Progressing  12/9/2024 0030 by Elena Montgomery RN  Outcome: Progressing     Problem: Discharge Planning  Goal: Discharge to home or other facility with appropriate resources  12/9/2024 1130 by Jennifer Segovia RN  Outcome: Progressing  12/9/2024 0030 by Elena Montgomery RN  Outcome: Progressing     Problem: Pain  Goal: Verbalizes/displays adequate comfort level or baseline comfort level  12/9/2024 1130 by Jennifer Segovia RN  Outcome: Progressing  12/9/2024 0030 by Elena Montgomery RN  Outcome: Progressing  Flowsheets  Taken 12/8/2024 1550 by Rolanda Martinez RN  Verbalizes/displays adequate comfort level or baseline comfort level: Encourage patient to monitor pain and request assistance  Taken 12/8/2024 1531 by Rolanda Martinez RN  Verbalizes/displays adequate comfort level or baseline comfort level: Assess pain using appropriate pain scale     Problem: Safety - Adult  Goal: Free from fall injury  12/9/2024 1130 by Jennifer Segovia RN  Outcome: Progressing  12/9/2024 0030 by Elena Montgomery RN  Outcome: Progressing     Problem: Skin/Tissue Integrity  Goal: Absence of new skin breakdown  Description: 1.  Monitor for areas of redness and/or skin breakdown  2.  Assess vascular access sites hourly  3.  Every 4-6 hours minimum:  Change oxygen saturation probe site  4.  Every 4-6 hours:  If on nasal continuous positive airway pressure, respiratory therapy assess nares and determine need for appliance change or resting period.  Outcome: Progressing     Problem: ABCDS Injury Assessment  Goal: Absence of physical injury  Outcome: Progressing

## 2024-12-09 NOTE — PLAN OF CARE
Problem: Chronic Conditions and Co-morbidities  Goal: Patient's chronic conditions and co-morbidity symptoms are monitored and maintained or improved  Outcome: Progressing     Problem: Discharge Planning  Goal: Discharge to home or other facility with appropriate resources  Outcome: Progressing     Problem: Pain  Goal: Verbalizes/displays adequate comfort level or baseline comfort level  Outcome: Progressing  Flowsheets  Taken 12/8/2024 1550 by Rolanda Martinez, RN  Verbalizes/displays adequate comfort level or baseline comfort level: Encourage patient to monitor pain and request assistance  Taken 12/8/2024 1531 by Rolanda Martinez, RN  Verbalizes/displays adequate comfort level or baseline comfort level: Assess pain using appropriate pain scale     Problem: Safety - Adult  Goal: Free from fall injury  Outcome: Progressing

## 2024-12-09 NOTE — CONSULTS
[facility] Nurse  Consult Note       Kristyn Rascon  AGE: 79 y.o.   GENDER: female  : 1945  TODAY'S DATE:  2024    Subjective:     Reason for Evaluation and Assessment: wound assessment       Kristyn Rascon is a 79 y.o. female referred by:   [x] Physician  [] Nursing  [] Other:     Wound Identification:  Wound Type:  MASD and yeast  Contributing Factors: decreased mobility, obesity, incontinence of urine, poor hygiene, and moisture         PAST MEDICAL HISTORY        Diagnosis Date    Cerebral artery occlusion with cerebral infarction (HCC)     Diabetes mellitus (HCC)     Hypertension     L1 vertebral fracture (HCC)        PAST SURGICAL HISTORY    Past Surgical History:   Procedure Laterality Date    BACK SURGERY      CAROTID STENT PLACEMENT       SECTION      CHOLECYSTECTOMY      FEMUR SURGERY Left 2024    FEMUR INTRAMEDULLARY NAIL ESEQUIEL INSERTION ANTEGRADE performed by Christopher Sanabria MD at [facility] OR       FAMILY HISTORY    No family history on file.    SOCIAL HISTORY    Social History     Tobacco Use    Smoking status: Never   Substance Use Topics    Alcohol use: Never    Drug use: Never       ALLERGIES    No Known Allergies    MEDICATIONS    No current facility-administered medications on file prior to encounter.     Current Outpatient Medications on File Prior to Encounter   Medication Sig Dispense Refill    methocarbamol (ROBAXIN) 750 MG tablet Take 1 tablet by mouth 4 times daily as needed      gabapentin (NEURONTIN) 100 MG capsule Take 2 capsules by mouth in the morning and at bedtime.      metFORMIN (GLUCOPHAGE) 850 MG tablet Take 1 tablet by mouth 2 times daily (with meals)      carvedilol (COREG) 6.25 MG tablet Take 1 tablet by mouth 2 times daily (with meals)           Objective:      /81   Pulse 79   Temp 97.9 °F (36.6 °C) (Oral)   Resp 18   Ht 1.499 m (4' 11\")   Wt 70.3 kg (155 lb)   SpO2 96%   BMI 31.31 kg/m²   Brant Risk Score: Brant Scale 
Comprehensive Nutrition Assessment    Type and Reason for Visit:  Initial, Wound, Consult    Nutrition Recommendations/Plan:   Carb Control 4 Diet available/appropriate as needed  Begin low brock high protein oral nutrition supplement bid  Check A1c     Malnutrition Assessment:  Malnutrition Status:  Insufficient data (12/09/24 1516)    Context:  Acute Illness       Nutrition Assessment:    Pt admitted from home with left hip comminuted displaced subtrochanteric femur fracture, s/p repair yesterday. H/O HTN, T2DM with peripheral neuropathy, CKD3a, acquired hypothyroidism, OAB and class I obesity. Wounds noted. She is feeding self on a Regular Diet. Wt appears stable PTA. Will order oral supplement to optimize healing and continue to follow as high nutrition risk.    Nutrition Related Findings:    Na 131, BUN 27, GFR 45, Glu 268-394   Wound Type: Multiple, Moisture Associate Skin Damage (Yeast)       Current Nutrition Intake & Therapies:    Average Meal Intake: Unable to assess  Average Supplements Intake: None Ordered  ADULT DIET; Regular    Anthropometric Measures:  Height: 149.9 cm (4' 11\")  Ideal Body Weight (IBW): 95 lbs (43 kg)    Admission Body Weight: 70.3 kg (154 lb 15.7 oz)  Current Body Weight: 70.3 kg (154 lb 15.7 oz),   IBW. Weight Source: Stated  Current BMI (kg/m2): 31.3  Usual Body Weight: 70.2 kg (154 lb 12.2 oz) (9/11/24)     % Weight Change (Calculated): 0.1                         Estimated Daily Nutrient Needs:  Energy Requirements Based On: Formula  Weight Used for Energy Requirements: Current  Energy (kcal/day): 1403 (MSJ)  Weight Used for Protein Requirements: Ideal  Protein (g/day): 48-54 (1-1.2 g/kg IBW)  Method Used for Fluid Requirements: 1 ml/kcal  Fluid (ml/day): 1400    Nutrition Diagnosis:   Predicted inadequate energy intake related to increase demand for energy/nutrients as evidenced by wounds    Nutrition Interventions:   Food and/or Nutrient Delivery: Continue Current Diet, Start 
Orthopaedic Consult    Patient Name: Kristyn Rascon   (1945)  MRN   [id number]   Today's date:  2024    CHIEF COMPLAINT:   Right hip pain    HISTORY OF PRESENT ILLNESS:      The patient is a 79 y.o. female  who presents to the ER after a fall.      X-rays were done in the ER showing a Right sub trochanteric hip fracture.    After the fall the patient was unable to ambulate and the pain was sudden, sharp and rated 10/10    The patient denied any chest pain, shortness of breath or syncopal episode prior to the fall.    Prior to the fall the patient had no hip pain and ambulated with a walker    Past Medical History         Diagnosis Date    Cerebral artery occlusion with cerebral infarction (HCC)     Diabetes mellitus (HCC)     Hypertension     L1 vertebral fracture (HCC)        Past Surgical History         Procedure Laterality Date    BACK SURGERY      CAROTID STENT PLACEMENT       SECTION      CHOLECYSTECTOMY         Medications Prior to Admission:     Prior to Admission medications    Medication Sig Start Date End Date Taking? Authorizing Provider   methocarbamol (ROBAXIN) 750 MG tablet Take 1 tablet by mouth 4 times daily as needed   Yes Ting Anna MD   gabapentin (NEURONTIN) 100 MG capsule Take 2 capsules by mouth in the morning and at bedtime.   Yes Ting Anna MD   metFORMIN (GLUCOPHAGE) 850 MG tablet Take 1 tablet by mouth 2 times daily (with meals)   Yes Ting Anna MD   carvedilol (COREG) 6.25 MG tablet Take 1 tablet by mouth 2 times daily (with meals)   Yes Ting Anna MD       Allergies     Patient has no known allergies.    Social History   TOBACCO:   reports that she has never smoked. She does not have any smokeless tobacco history on file.  ETOH:   reports no history of alcohol use.  Patient currently lives with family     Family History   No family history on file.    Review of Systems   As in the admission H&P, reviewed and 
[facility] ACUTE CARE OCCUPATIONAL THERAPY EVALUATION  Kristyn Rascon, 1945, 4130/4130-A, 2024    Discharge Recommendation: Encourage facility for post-acute rehabilitation, anticipate 1-2 hours per day and 5 days per week.    History  [place]:  The encounter diagnosis was Closed displaced spiral fracture of shaft of left femur, initial encounter ([facility]).  Patient  has a past medical history of Cerebral artery occlusion with cerebral infarction ([facility]), Diabetes mellitus ([facility]), Hypertension, and L1 vertebral fracture ([facility]).  Patient  has a past surgical history that includes Carotid stent placement; back surgery; Cholecystectomy;  section; and Femur Surgery (Left, 2024).    Subjective:  Patient states:  \"I have a calic in my hair from my dad and so does my brother, my sisters the flaquita one without it\"   Pain:  R LE pain d/t neuropathy and L hip pain at Sx site, did not rate numerically, increases with movement/touch.    Communication: RN, CM, co-eval with PT   Restrictions: general precautions, fall risk and WBAT L LE    Home Setup/Prior level of function  Social/Functional History  Lives With: Daughter  Type of Home: Trailer  Home Layout: One level  Home Access: Ramped entrance  Bathroom Shower/Tub: Tub/Shower unit  Home Equipment: Walker - Rolling, Wheelchair - Manual  Prior Level of Assist for ADLs: Independent  Prior Level of Assist for Homemaking: Independent  Homemaking Responsibilities: Yes  Prior Level of Assist for Ambulation: Independent household ambulator, with or without device (mod I with RW in the home and WC for community distances)  Prior Level of Assist for Transfers: Independent  Active : No (daughter provides all transportation)    Examination of body systems (includes body structures/functions, activity/participation limitations):  Observation:  Supine in bed upon arrival, agreeable to therapy with education  Vision:  WFL  Hearing:  slightly 
see OT/SLP note for further evaluation.    Musculoskeletal  ROM R/L:  WFL.    Strength R LE 4+/5, L LE 3-/5; significant impairment in function and endurance.    Neuro:  peripheral neuropathy bilateral LEs      Mobility:  Rolling L/R:  mod A  Supine <-> sit:  mod A x2 with assist at bilateral LEs, hips, and trunk. Cues provided for sequencing. Pt attempted without assist but required significant increased time and increased pain  Transfers: pt deferred  Sitting balance:  max A progressing to CGA with cues for posture and positioning.    Standing balance:  NT.    Gait: NT    [facility] 6 Clicks Inpatient Mobility:  AM-PAC Inpatient Mobility Raw Score : 9    Safety: patient left supine in bed with alarm on, family present, call light within reach, RN notified, gait belt used.    Assessment:  Pt is an 79 y.o. female admitted to the hospital after a fall resulting in a left hip fracture. Pt underwent a left ORIF nailing on 12/8/24. Pt is typically mod I with RW in the home and WC in the community. Pt is currently performing bed mobility mod A x2, sitting balance max A progressing to CGA, and did not transfer or ambulate this date. Pt is presenting with decreased endurance, impaired transfers, impaired gait, increased pain, impaired balance. Pt would benefit from continued acute care PT as well as Facility for moderate post-acute rehabilitation, anticipate 1-2 hours per day and 5 days per week.     Complexity: moderate    Prognosis: Good, no significant barriers to participation at this time.     General Plan: 6-7 times per week         Goals:  Short Term Goals  Time Frame for Short Term Goals: 1 week  Short Term Goal 1: pt to complete all bed mobility mod A  Short Term Goal 2: pt to complete all STS transfers to/from bed, commode, and chair mod A  Short Term Goal 3: pt to ambulate 25' with LRAD min A       Treatment plan:  Bed mobility, transfers, balance, gait, TA, TX,    Recommendations for NURSING mobility: bed mobility

## 2024-12-09 NOTE — CARE COORDINATION
12/09/24 1554   Service Assessment   Patient Orientation Alert and Oriented   Cognition Alert   History Provided By Patient;Child/Family;Medical Record  (called daughter Karrie)   Primary Caregiver   (daughter Karrie)   Support Systems Children;Family Members   PCP Verified by CM Yes   Last Visit to PCP Within last 3 months   Prior Functional Level Independent in ADLs/IADLs   Current Functional Level Assistance with the following:;Bathing;Dressing;Toileting;Mobility   Can patient return to prior living arrangement Unknown at present   Ability to make needs known: Good   Family able to assist with home care needs: Yes   Would you like for me to discuss the discharge plan with any other family members/significant others, and if so, who? Yes  (yes my daughter Karrie)   Financial Resources Medicare  (BCBS Medicare)   Social/Functional History   Lives With Daughter   Type of Home Trailer   Home Equipment Walker - Rolling;Wheelchair - Manual   Active  No  (daughter provides all transportation)   Condition of Participation: Discharge Planning   The Patient and/or Patient Representative was provided with a Choice of Provider? Patient   The Patient and/Or Patient Representative agree with the Discharge Plan? Yes   Freedom of Choice list was provided with basic dialogue that supports the patient's individualized plan of care/goals, treatment preferences, and shares the quality data associated with the providers?  Yes     Reviewed chart, discussed in IDR and spoke with pt then call her sourav Yoon per pt's request.  Plan is SNU near [facility], left PPO SNU list in room and will need to call Daughter in AM with choice.

## 2024-12-10 LAB
GLUCOSE BLD-MCNC: 132 MG/DL (ref 74–99)
GLUCOSE BLD-MCNC: 159 MG/DL (ref 74–99)
GLUCOSE BLD-MCNC: 211 MG/DL (ref 74–99)
GLUCOSE BLD-MCNC: 275 MG/DL (ref 74–99)

## 2024-12-10 PROCEDURE — 2580000003 HC RX 258: Performed by: ORTHOPAEDIC SURGERY

## 2024-12-10 PROCEDURE — 6370000000 HC RX 637 (ALT 250 FOR IP): Performed by: ORTHOPAEDIC SURGERY

## 2024-12-10 PROCEDURE — 97530 THERAPEUTIC ACTIVITIES: CPT

## 2024-12-10 PROCEDURE — 82962 GLUCOSE BLOOD TEST: CPT

## 2024-12-10 PROCEDURE — 6370000000 HC RX 637 (ALT 250 FOR IP): Performed by: STUDENT IN AN ORGANIZED HEALTH CARE EDUCATION/TRAINING PROGRAM

## 2024-12-10 PROCEDURE — 94761 N-INVAS EAR/PLS OXIMETRY MLT: CPT

## 2024-12-10 PROCEDURE — 97535 SELF CARE MNGMENT TRAINING: CPT

## 2024-12-10 PROCEDURE — 6360000002 HC RX W HCPCS: Performed by: ORTHOPAEDIC SURGERY

## 2024-12-10 PROCEDURE — 94150 VITAL CAPACITY TEST: CPT

## 2024-12-10 PROCEDURE — 1200000000 HC SEMI PRIVATE

## 2024-12-10 PROCEDURE — 2580000003 HC RX 258: Performed by: STUDENT IN AN ORGANIZED HEALTH CARE EDUCATION/TRAINING PROGRAM

## 2024-12-10 RX ADMIN — HYDROMORPHONE HYDROCHLORIDE 0.5 MG: 1 INJECTION, SOLUTION INTRAMUSCULAR; INTRAVENOUS; SUBCUTANEOUS at 01:13

## 2024-12-10 RX ADMIN — TROSPIUM CHLORIDE 20 MG: 20 TABLET, FILM COATED ORAL at 00:01

## 2024-12-10 RX ADMIN — OXYCODONE HYDROCHLORIDE AND ACETAMINOPHEN 1 TABLET: 5; 325 TABLET ORAL at 06:25

## 2024-12-10 RX ADMIN — INSULIN LISPRO 4 UNITS: 100 INJECTION, SOLUTION INTRAVENOUS; SUBCUTANEOUS at 18:32

## 2024-12-10 RX ADMIN — POLYETHYLENE GLYCOL (3350) 17 G: 17 POWDER, FOR SOLUTION ORAL at 01:28

## 2024-12-10 RX ADMIN — LEVOTHYROXINE SODIUM 88 MCG: 0.09 TABLET ORAL at 06:25

## 2024-12-10 RX ADMIN — MICONAZOLE NITRATE: 2 POWDER TOPICAL at 11:21

## 2024-12-10 RX ADMIN — Medication 2 TABLET: at 15:32

## 2024-12-10 RX ADMIN — OXYCODONE HYDROCHLORIDE AND ACETAMINOPHEN 1 TABLET: 5; 325 TABLET ORAL at 10:31

## 2024-12-10 RX ADMIN — ACETAMINOPHEN 650 MG: 325 TABLET ORAL at 22:03

## 2024-12-10 RX ADMIN — ENOXAPARIN SODIUM 40 MG: 100 INJECTION SUBCUTANEOUS at 09:11

## 2024-12-10 RX ADMIN — MICONAZOLE NITRATE: 2 POWDER TOPICAL at 22:04

## 2024-12-10 RX ADMIN — GABAPENTIN 200 MG: 100 CAPSULE ORAL at 00:00

## 2024-12-10 RX ADMIN — INSULIN GLARGINE 10 UNITS: 100 INJECTION, SOLUTION SUBCUTANEOUS at 22:03

## 2024-12-10 RX ADMIN — OXYCODONE HYDROCHLORIDE AND ACETAMINOPHEN 1 TABLET: 5; 325 TABLET ORAL at 00:02

## 2024-12-10 RX ADMIN — ACETAMINOPHEN 650 MG: 325 TABLET ORAL at 09:10

## 2024-12-10 RX ADMIN — TROSPIUM CHLORIDE 20 MG: 20 TABLET, FILM COATED ORAL at 22:03

## 2024-12-10 RX ADMIN — GABAPENTIN 200 MG: 100 CAPSULE ORAL at 22:03

## 2024-12-10 RX ADMIN — OXYCODONE HYDROCHLORIDE AND ACETAMINOPHEN 1 TABLET: 5; 325 TABLET ORAL at 15:33

## 2024-12-10 RX ADMIN — SODIUM CHLORIDE, PRESERVATIVE FREE 10 ML: 5 INJECTION INTRAVENOUS at 22:04

## 2024-12-10 RX ADMIN — ACETAMINOPHEN 650 MG: 325 TABLET ORAL at 01:12

## 2024-12-10 RX ADMIN — OXYCODONE HYDROCHLORIDE AND ACETAMINOPHEN 1 TABLET: 5; 325 TABLET ORAL at 23:07

## 2024-12-10 RX ADMIN — GABAPENTIN 200 MG: 100 CAPSULE ORAL at 09:10

## 2024-12-10 RX ADMIN — INSULIN GLARGINE 10 UNITS: 100 INJECTION, SOLUTION SUBCUTANEOUS at 00:02

## 2024-12-10 RX ADMIN — INSULIN LISPRO 2 UNITS: 100 INJECTION, SOLUTION INTRAVENOUS; SUBCUTANEOUS at 22:03

## 2024-12-10 RX ADMIN — CARVEDILOL 6.25 MG: 6.25 TABLET, FILM COATED ORAL at 09:11

## 2024-12-10 RX ADMIN — INSULIN LISPRO 4 UNITS: 100 INJECTION, SOLUTION INTRAVENOUS; SUBCUTANEOUS at 00:01

## 2024-12-10 RX ADMIN — SODIUM CHLORIDE: 9 INJECTION, SOLUTION INTRAVENOUS at 22:15

## 2024-12-10 ASSESSMENT — PAIN SCALES - WONG BAKER
WONGBAKER_NUMERICALRESPONSE: NO HURT
WONGBAKER_NUMERICALRESPONSE: NO HURT
WONGBAKER_NUMERICALRESPONSE: HURTS A LITTLE BIT

## 2024-12-10 ASSESSMENT — PAIN SCALES - GENERAL
PAINLEVEL_OUTOF10: 9
PAINLEVEL_OUTOF10: 8
PAINLEVEL_OUTOF10: 3
PAINLEVEL_OUTOF10: 10
PAINLEVEL_OUTOF10: 10
PAINLEVEL_OUTOF10: 8
PAINLEVEL_OUTOF10: 9
PAINLEVEL_OUTOF10: 10
PAINLEVEL_OUTOF10: 8
PAINLEVEL_OUTOF10: 8
PAINLEVEL_OUTOF10: 0

## 2024-12-10 ASSESSMENT — PAIN - FUNCTIONAL ASSESSMENT
PAIN_FUNCTIONAL_ASSESSMENT: PREVENTS OR INTERFERES SOME ACTIVE ACTIVITIES AND ADLS
PAIN_FUNCTIONAL_ASSESSMENT: ACTIVITIES ARE NOT PREVENTED
PAIN_FUNCTIONAL_ASSESSMENT: PREVENTS OR INTERFERES SOME ACTIVE ACTIVITIES AND ADLS
PAIN_FUNCTIONAL_ASSESSMENT: PREVENTS OR INTERFERES SOME ACTIVE ACTIVITIES AND ADLS

## 2024-12-10 ASSESSMENT — PAIN DESCRIPTION - PAIN TYPE: TYPE: SURGICAL PAIN

## 2024-12-10 ASSESSMENT — PAIN DESCRIPTION - ORIENTATION
ORIENTATION: LEFT
ORIENTATION: LEFT;RIGHT
ORIENTATION: LEFT;RIGHT
ORIENTATION: RIGHT
ORIENTATION: LEFT;MID
ORIENTATION: LEFT
ORIENTATION: LEFT

## 2024-12-10 ASSESSMENT — PAIN DESCRIPTION - LOCATION
LOCATION: HIP
LOCATION: HIP
LOCATION: HIP;LEG
LOCATION: HIP;LEG
LOCATION: HIP
LOCATION: BACK;LEG;HIP
LOCATION: HIP

## 2024-12-10 ASSESSMENT — PAIN DESCRIPTION - DESCRIPTORS
DESCRIPTORS: ACHING;TENDER;TIGHTNESS
DESCRIPTORS: DISCOMFORT
DESCRIPTORS: ACHING;DISCOMFORT
DESCRIPTORS: SHOOTING;STABBING
DESCRIPTORS: DISCOMFORT
DESCRIPTORS: DISCOMFORT
DESCRIPTORS: DISCOMFORT;ACHING;THROBBING

## 2024-12-10 ASSESSMENT — PAIN DESCRIPTION - FREQUENCY: FREQUENCY: CONTINUOUS

## 2024-12-10 ASSESSMENT — PAIN DESCRIPTION - ONSET: ONSET: ON-GOING

## 2024-12-10 NOTE — CARE COORDINATION
Chart reviewed and call to patient daughter, Karrie, for continued discharge planning for SNF choice. CM introduced self and explained role. Karrie stated that she is going to visit 2 different SNF this afternoon and will update this CM once decision has been made.

## 2024-12-11 LAB
EST. AVERAGE GLUCOSE BLD GHB EST-MCNC: 189 MG/DL
GLUCOSE BLD-MCNC: 149 MG/DL (ref 74–99)
GLUCOSE BLD-MCNC: 181 MG/DL (ref 74–99)
GLUCOSE BLD-MCNC: 200 MG/DL (ref 74–99)
GLUCOSE BLD-MCNC: 218 MG/DL (ref 74–99)
HBA1C MFR BLD: 8.2 % (ref 4.2–6.3)

## 2024-12-11 PROCEDURE — 6370000000 HC RX 637 (ALT 250 FOR IP): Performed by: ORTHOPAEDIC SURGERY

## 2024-12-11 PROCEDURE — 97530 THERAPEUTIC ACTIVITIES: CPT

## 2024-12-11 PROCEDURE — 6360000002 HC RX W HCPCS: Performed by: ORTHOPAEDIC SURGERY

## 2024-12-11 PROCEDURE — 1200000000 HC SEMI PRIVATE

## 2024-12-11 PROCEDURE — 2580000003 HC RX 258: Performed by: ORTHOPAEDIC SURGERY

## 2024-12-11 PROCEDURE — 6370000000 HC RX 637 (ALT 250 FOR IP): Performed by: STUDENT IN AN ORGANIZED HEALTH CARE EDUCATION/TRAINING PROGRAM

## 2024-12-11 PROCEDURE — 97535 SELF CARE MNGMENT TRAINING: CPT

## 2024-12-11 PROCEDURE — 94150 VITAL CAPACITY TEST: CPT

## 2024-12-11 PROCEDURE — 2580000003 HC RX 258: Performed by: STUDENT IN AN ORGANIZED HEALTH CARE EDUCATION/TRAINING PROGRAM

## 2024-12-11 PROCEDURE — 94761 N-INVAS EAR/PLS OXIMETRY MLT: CPT

## 2024-12-11 PROCEDURE — 82962 GLUCOSE BLOOD TEST: CPT

## 2024-12-11 RX ADMIN — ACETAMINOPHEN 650 MG: 325 TABLET ORAL at 21:50

## 2024-12-11 RX ADMIN — OXYCODONE HYDROCHLORIDE AND ACETAMINOPHEN 1 TABLET: 5; 325 TABLET ORAL at 18:15

## 2024-12-11 RX ADMIN — TROSPIUM CHLORIDE 20 MG: 20 TABLET, FILM COATED ORAL at 21:51

## 2024-12-11 RX ADMIN — INSULIN GLARGINE 10 UNITS: 100 INJECTION, SOLUTION SUBCUTANEOUS at 21:51

## 2024-12-11 RX ADMIN — CARVEDILOL 6.25 MG: 6.25 TABLET, FILM COATED ORAL at 10:02

## 2024-12-11 RX ADMIN — LEVOTHYROXINE SODIUM 88 MCG: 0.09 TABLET ORAL at 10:18

## 2024-12-11 RX ADMIN — Medication 2 TABLET: at 12:21

## 2024-12-11 RX ADMIN — ACETAMINOPHEN 650 MG: 325 TABLET ORAL at 10:03

## 2024-12-11 RX ADMIN — INSULIN LISPRO 2 UNITS: 100 INJECTION, SOLUTION INTRAVENOUS; SUBCUTANEOUS at 21:51

## 2024-12-11 RX ADMIN — CARVEDILOL 6.25 MG: 6.25 TABLET, FILM COATED ORAL at 16:47

## 2024-12-11 RX ADMIN — MICONAZOLE NITRATE: 2 POWDER TOPICAL at 10:13

## 2024-12-11 RX ADMIN — INSULIN LISPRO 2 UNITS: 100 INJECTION, SOLUTION INTRAVENOUS; SUBCUTANEOUS at 18:17

## 2024-12-11 RX ADMIN — HYDROMORPHONE HYDROCHLORIDE 0.5 MG: 1 INJECTION, SOLUTION INTRAMUSCULAR; INTRAVENOUS; SUBCUTANEOUS at 21:17

## 2024-12-11 RX ADMIN — HYDROMORPHONE HYDROCHLORIDE 0.5 MG: 1 INJECTION, SOLUTION INTRAMUSCULAR; INTRAVENOUS; SUBCUTANEOUS at 12:22

## 2024-12-11 RX ADMIN — OXYCODONE HYDROCHLORIDE AND ACETAMINOPHEN 1 TABLET: 5; 325 TABLET ORAL at 04:50

## 2024-12-11 RX ADMIN — ENOXAPARIN SODIUM 40 MG: 100 INJECTION SUBCUTANEOUS at 10:13

## 2024-12-11 RX ADMIN — SODIUM CHLORIDE, PRESERVATIVE FREE 10 ML: 5 INJECTION INTRAVENOUS at 10:14

## 2024-12-11 RX ADMIN — SODIUM CHLORIDE: 9 INJECTION, SOLUTION INTRAVENOUS at 16:50

## 2024-12-11 RX ADMIN — HYDROMORPHONE HYDROCHLORIDE 0.5 MG: 1 INJECTION, SOLUTION INTRAMUSCULAR; INTRAVENOUS; SUBCUTANEOUS at 02:32

## 2024-12-11 RX ADMIN — GABAPENTIN 200 MG: 100 CAPSULE ORAL at 10:02

## 2024-12-11 RX ADMIN — GABAPENTIN 200 MG: 100 CAPSULE ORAL at 21:51

## 2024-12-11 RX ADMIN — MICONAZOLE NITRATE: 2 POWDER TOPICAL at 21:55

## 2024-12-11 RX ADMIN — ACETAMINOPHEN 650 MG: 325 TABLET ORAL at 16:44

## 2024-12-11 ASSESSMENT — PAIN DESCRIPTION - DESCRIPTORS
DESCRIPTORS: ACHING;STABBING;SHARP
DESCRIPTORS: ACHING
DESCRIPTORS: ACHING;SPASM
DESCRIPTORS: ACHING
DESCRIPTORS: ACHING
DESCRIPTORS: ACHING;SHARP;SHOOTING

## 2024-12-11 ASSESSMENT — PAIN SCALES - GENERAL
PAINLEVEL_OUTOF10: 8
PAINLEVEL_OUTOF10: 8
PAINLEVEL_OUTOF10: 9
PAINLEVEL_OUTOF10: 10
PAINLEVEL_OUTOF10: 0
PAINLEVEL_OUTOF10: 9
PAINLEVEL_OUTOF10: 2
PAINLEVEL_OUTOF10: 8
PAINLEVEL_OUTOF10: 3
PAINLEVEL_OUTOF10: 10

## 2024-12-11 ASSESSMENT — PAIN DESCRIPTION - ORIENTATION
ORIENTATION: LEFT
ORIENTATION: LEFT
ORIENTATION: MID
ORIENTATION: LEFT
ORIENTATION: RIGHT;LEFT
ORIENTATION: LEFT

## 2024-12-11 ASSESSMENT — PAIN DESCRIPTION - LOCATION
LOCATION: LEG;HIP
LOCATION: HIP
LOCATION: HIP;LEG
LOCATION: HIP
LOCATION: BACK
LOCATION: HIP

## 2024-12-11 ASSESSMENT — PAIN - FUNCTIONAL ASSESSMENT
PAIN_FUNCTIONAL_ASSESSMENT: PREVENTS OR INTERFERES SOME ACTIVE ACTIVITIES AND ADLS

## 2024-12-11 ASSESSMENT — PAIN SCALES - WONG BAKER
WONGBAKER_NUMERICALRESPONSE: HURTS A LITTLE BIT
WONGBAKER_NUMERICALRESPONSE: HURTS A LITTLE BIT

## 2024-12-11 ASSESSMENT — PAIN DESCRIPTION - PAIN TYPE: TYPE: ACUTE PAIN

## 2024-12-11 NOTE — PLAN OF CARE
Problem: Chronic Conditions and Co-morbidities  Goal: Patient's chronic conditions and co-morbidity symptoms are monitored and maintained or improved  Outcome: Progressing     Problem: Discharge Planning  Goal: Discharge to home or other facility with appropriate resources  Outcome: Progressing     Problem: Pain  Goal: Verbalizes/displays adequate comfort level or baseline comfort level  Outcome: Progressing  Flowsheets (Taken 12/10/2024 2200)  Verbalizes/displays adequate comfort level or baseline comfort level: Encourage patient to monitor pain and request assistance     Problem: Safety - Adult  Goal: Free from fall injury  Outcome: Progressing     Problem: Skin/Tissue Integrity  Goal: Absence of new skin breakdown  Description: 1.  Monitor for areas of redness and/or skin breakdown  2.  Assess vascular access sites hourly  3.  Every 4-6 hours minimum:  Change oxygen saturation probe site  4.  Every 4-6 hours:  If on nasal continuous positive airway pressure, respiratory therapy assess nares and determine need for appliance change or resting period.  Outcome: Progressing     Problem: ABCDS Injury Assessment  Goal: Absence of physical injury  Outcome: Progressing     Problem: Nutrition Deficit:  Goal: Optimize nutritional status  Outcome: Progressing

## 2024-12-11 NOTE — CARE COORDINATION
Received voicemail from patients daughter, Karrie, stating that she would like referral to [facility] in [place]. Referral packet faxed to them at this time.    9:03 AM Call to [facility] and voicemail left for admissions with request for return call.     11:44 AM Received call from patients daughter stating that she would like to change referral to [facility]. Call to [facility] and faxed referral at this time.

## 2024-12-12 VITALS
HEART RATE: 86 BPM | HEIGHT: 59 IN | TEMPERATURE: 97.3 F | OXYGEN SATURATION: 96 % | SYSTOLIC BLOOD PRESSURE: 109 MMHG | DIASTOLIC BLOOD PRESSURE: 61 MMHG | BODY MASS INDEX: 31.25 KG/M2 | RESPIRATION RATE: 17 BRPM | WEIGHT: 155 LBS

## 2024-12-12 LAB
GLUCOSE BLD-MCNC: 135 MG/DL (ref 74–99)
GLUCOSE BLD-MCNC: 156 MG/DL (ref 74–99)

## 2024-12-12 PROCEDURE — 82962 GLUCOSE BLOOD TEST: CPT

## 2024-12-12 PROCEDURE — 94761 N-INVAS EAR/PLS OXIMETRY MLT: CPT

## 2024-12-12 PROCEDURE — 2580000003 HC RX 258: Performed by: ORTHOPAEDIC SURGERY

## 2024-12-12 PROCEDURE — 6360000002 HC RX W HCPCS: Performed by: ORTHOPAEDIC SURGERY

## 2024-12-12 PROCEDURE — 97530 THERAPEUTIC ACTIVITIES: CPT

## 2024-12-12 PROCEDURE — 6370000000 HC RX 637 (ALT 250 FOR IP): Performed by: ORTHOPAEDIC SURGERY

## 2024-12-12 PROCEDURE — 6370000000 HC RX 637 (ALT 250 FOR IP): Performed by: STUDENT IN AN ORGANIZED HEALTH CARE EDUCATION/TRAINING PROGRAM

## 2024-12-12 RX ORDER — ENOXAPARIN SODIUM 100 MG/ML
40 INJECTION SUBCUTANEOUS DAILY
Qty: 12 ML | Refills: 0 | Status: SHIPPED | OUTPATIENT
Start: 2024-12-13

## 2024-12-12 RX ORDER — HYDROCODONE BITARTRATE AND ACETAMINOPHEN 5; 325 MG/1; MG/1
1 TABLET ORAL EVERY 6 HOURS PRN
Qty: 12 TABLET | Refills: 0 | Status: SHIPPED | OUTPATIENT
Start: 2024-12-12 | End: 2024-12-15

## 2024-12-12 RX ORDER — HYDROCODONE BITARTRATE AND ACETAMINOPHEN 5; 325 MG/1; MG/1
1 TABLET ORAL EVERY 6 HOURS PRN
Qty: 12 TABLET | Refills: 0 | Status: SHIPPED | OUTPATIENT
Start: 2024-12-12 | End: 2024-12-12

## 2024-12-12 RX ORDER — LEVOTHYROXINE SODIUM 88 UG/1
88 TABLET ORAL DAILY
Qty: 30 TABLET | Refills: 3 | Status: SHIPPED | OUTPATIENT
Start: 2024-12-13

## 2024-12-12 RX ADMIN — ENOXAPARIN SODIUM 40 MG: 100 INJECTION SUBCUTANEOUS at 09:11

## 2024-12-12 RX ADMIN — CARVEDILOL 6.25 MG: 6.25 TABLET, FILM COATED ORAL at 09:11

## 2024-12-12 RX ADMIN — GABAPENTIN 200 MG: 100 CAPSULE ORAL at 09:11

## 2024-12-12 RX ADMIN — OXYCODONE HYDROCHLORIDE AND ACETAMINOPHEN 1 TABLET: 5; 325 TABLET ORAL at 07:20

## 2024-12-12 RX ADMIN — HYDROMORPHONE HYDROCHLORIDE 0.5 MG: 1 INJECTION, SOLUTION INTRAMUSCULAR; INTRAVENOUS; SUBCUTANEOUS at 09:20

## 2024-12-12 RX ADMIN — LEVOTHYROXINE SODIUM 88 MCG: 0.09 TABLET ORAL at 06:08

## 2024-12-12 RX ADMIN — HYDROMORPHONE HYDROCHLORIDE 0.5 MG: 1 INJECTION, SOLUTION INTRAMUSCULAR; INTRAVENOUS; SUBCUTANEOUS at 02:59

## 2024-12-12 RX ADMIN — Medication 2 TABLET: at 12:19

## 2024-12-12 RX ADMIN — ACETAMINOPHEN 650 MG: 325 TABLET ORAL at 14:44

## 2024-12-12 RX ADMIN — SODIUM CHLORIDE, PRESERVATIVE FREE 10 ML: 5 INJECTION INTRAVENOUS at 09:11

## 2024-12-12 RX ADMIN — OXYCODONE HYDROCHLORIDE AND ACETAMINOPHEN 1 TABLET: 5; 325 TABLET ORAL at 01:19

## 2024-12-12 RX ADMIN — HYDROMORPHONE HYDROCHLORIDE 0.5 MG: 1 INJECTION, SOLUTION INTRAMUSCULAR; INTRAVENOUS; SUBCUTANEOUS at 14:45

## 2024-12-12 RX ADMIN — ACETAMINOPHEN 650 MG: 325 TABLET ORAL at 03:31

## 2024-12-12 ASSESSMENT — PAIN - FUNCTIONAL ASSESSMENT
PAIN_FUNCTIONAL_ASSESSMENT: ACTIVITIES ARE NOT PREVENTED
PAIN_FUNCTIONAL_ASSESSMENT: ACTIVITIES ARE NOT PREVENTED
PAIN_FUNCTIONAL_ASSESSMENT: PREVENTS OR INTERFERES SOME ACTIVE ACTIVITIES AND ADLS
PAIN_FUNCTIONAL_ASSESSMENT: PREVENTS OR INTERFERES SOME ACTIVE ACTIVITIES AND ADLS
PAIN_FUNCTIONAL_ASSESSMENT: ACTIVITIES ARE NOT PREVENTED

## 2024-12-12 ASSESSMENT — PAIN SCALES - GENERAL
PAINLEVEL_OUTOF10: 2
PAINLEVEL_OUTOF10: 9
PAINLEVEL_OUTOF10: 9
PAINLEVEL_OUTOF10: 10
PAINLEVEL_OUTOF10: 8
PAINLEVEL_OUTOF10: 9
PAINLEVEL_OUTOF10: 8
PAINLEVEL_OUTOF10: 8
PAINLEVEL_OUTOF10: 6
PAINLEVEL_OUTOF10: 6
PAINLEVEL_OUTOF10: 9

## 2024-12-12 ASSESSMENT — PAIN DESCRIPTION - DESCRIPTORS
DESCRIPTORS: ACHING;SHARP
DESCRIPTORS: ACHING
DESCRIPTORS: ACHING;SHARP
DESCRIPTORS: SHARP
DESCRIPTORS: ACHING;SHARP
DESCRIPTORS: ACHING

## 2024-12-12 ASSESSMENT — PAIN DESCRIPTION - ORIENTATION
ORIENTATION: LEFT
ORIENTATION: LEFT;RIGHT
ORIENTATION: LEFT
ORIENTATION: LEFT

## 2024-12-12 ASSESSMENT — PAIN DESCRIPTION - LOCATION
LOCATION: HIP
LOCATION: HIP
LOCATION: LEG
LOCATION: HIP

## 2024-12-12 ASSESSMENT — PAIN SCALES - WONG BAKER: WONGBAKER_NUMERICALRESPONSE: HURTS A LITTLE BIT

## 2024-12-12 NOTE — CARE COORDINATION
Chart reviewed and call to [facility] admissions, who stated that they are clinically able to accept patient. TERRELL updated Samira JARRETTW with information.    12:12 PM Received update that patient has david approved for SNF. Perfect serve sent to Jimmy NP with information.    1:14 PM Discharge order noted. Transport arranged with [facility] for next available  of 3:15pm. TERRELL updated [facility] admissions, patients daughter and primary nurse with transport time.

## 2024-12-12 NOTE — CARE COORDINATION
Precert initiated and APPROVED via [employer] portal   The authorization request [id number] has been APPROVED   Effective From:  12/12/2024  Effective Through:  12/18/2024

## 2024-12-12 NOTE — PROGRESS NOTES
Kristyn Rascon (1945)    Daily Progress Note-  Christopher Sanabria MD,                 Today's Date:   12/9/2024          Subjective:     Doing well postoperatively.    Pain controlled.  Patient feels improved today.  No complaints    Objective:   Patient Vitals for the past 4 hrs:   BP Temp Temp src Pulse Resp SpO2   12/09/24 1635 -- -- -- -- 18 --   12/09/24 1446 -- -- -- -- 16 --   12/09/24 1410 (!) 98/59 98.4 °F (36.9 °C) Oral 88 16 98 %         Physical Exam:     The patient is awake and alert  Resting comfortably in bed    Operative extremity:    The dressing is clean dry and intact  Sensation and motor function intact distally      LABS   CBC:   Recent Labs     12/07/24  2015 12/08/24  1000 12/09/24  0854   WBC 13.0* 8.4 8.7   HGB 11.2* 10.9* 8.2*    313 262           Assessment and Plan     1.  POD # 1, Hip trochanteric nail of subtrochanteric fracture    1:  Physical therapy consult for mobilization   -WBAT   -ROM as tolerated.  Rehab will be challenging for her because of her underlying medical conditions and deconditioning  2:  DVT prophylaxis   -Lovenox x 4 weeks  3:  Continue Pain Control   -wean to PO meds  4.  Medical management per hospitalist service  5:  D/C Planning:     -Skilled Nursing Facility         Christopher Sanabria MD,      
    V2.0    [facility] [place]      Name:  Kristyn Rascon /Age/Sex: 1945  (79 y.o. female)   MRN & CSN:  [id number] & [id number] Encounter Date/Time: 2024 11:09 AM EST   Location:  [address] [place] [address]-A PCP: Elle Hidalgo APRN - NP     Attending:Joe Santana MD       Hospital Day: 3    Assessment and Recommendations   Kristyn Rascon is a 79 y.o. female  who presents with Closed left hip fracture, initial encounter ([facility])      # Closed left hip fracture  -Reported fall while getting into the shower and slipping leading to left hip pain. No head trauma or LOC. Not on anticoagulants. Ambulates with walker, no recent falls.  - NVI distally. XR showed acute comminuted subtrochanteric spiral fracture of left femur.  -Orthopedic surgery consulted and patient underwent intramedullary nail erinn insertion 2022-  -Continue postop management per orthopedic surgery recs    # Age-related osteoporosis  - No previous osteoporosis evaluation.  - Clinically has pathological fracture secondary to osteoporosis.  - Started Ca and VD3, will benefit from osteoporosis evaluation in a few weeks outpatient.     # Essential hypertension  - Elevated secondary to above.  - Continue Coreg, optimize pain management first.     # Suspected SKIP  - STOP-BANG high. No previous PSG.  - Requiring 2L NC while asleep, refused trial of CPAP.     # T2DM with hyperglycemia and peripheral neuropathy  - No A1c on file, ordered.    - Held metformin.  - LCSI. Continue gabapentin.     # CKD3a  - Labs overall stable, avoid nephrotoxic medications.      # Acquired hypothyroidism  - Continue Synthroid.  - Follow-up repeat TSH.     # OAB  - Continue Sanctura (for Vesicare).      # Class I obesity  - BMI 31.3.         Diet ADULT DIET; Regular   DVT Prophylaxis [] Lovenox, []  Heparin, [] SCDs, [] Ambulation,  [] Eliquis, [] Xarelto  [] Coumadin   Code Status Full Code   Disposition From: Home  Expected Disposition: TBD  Estimated Date of Discharge: 
   12/10/24 0904   Encounter Summary   Encounter Overview/Reason Spiritual/Emotional Needs   Encounter Code  Assessment by  services   Service Provided For Patient   Referral/Consult From [facility]   Support System Children   Last Encounter  12/10/24  (Offered prayer and comfort care for patient. Patient was in much pain and called nurse. Patient enjoyed pastoral visit.)   Complexity of Encounter Moderate   Begin Time 0830   End Time  0906   Total Time Calculated 36 min   Spiritual/Emotional needs   Type Emotional Distress   Grief, Loss, and Adjustments   Type Adjustment to illness   Assessment/Intervention/Outcome   Assessment Concerns with suffering   Intervention Active listening;Discussed belief system/Scientology practices/daina;Discussed illness injury and it’s impact;Discussed relationship with God;Empowerment;Explored/Affirmed feelings, thoughts, concerns;Nurtured Hope;Prayer (assurance of)/[place];Sustaining Presence/Ministry of presence;Life review/Legacy   Outcome Engaged in conversation;Encouraged;Comfort;Expressed Gratitude;Optimistic;Receptive   Plan and Referrals   Plan/Referrals Continue Support (comment)  (as needed)       
  Physical Therapy Treatment Note  Name: Kristyn Rascon MRN: [id number] :   1945   Date:  2024   Admission Date: 2024 Room:  [address] [place] [address]   Restrictions/Precautions:  WBAT L LE, general precautions, fall risk   Communication with other providers:  Pt okay to see for therapy per RN, CoTx with ASHLEY Hawley for pt safety and tolerance,.   Subjective:  Patient states:  Agreeable to session despite pn.   Pain:   Location, Type, Intensity (0/10 to 10/10):  Pt notes significant pain with movement and yelling out in pain with the same.   Objective:    Observation:  Pt supine in bed upon therapy arrival.   Objective Measures:  Tele, stable  Treatment, including education/measures:    Therapeutic Activity Training:   Therapeutic activity training was instructed today.  Cues were given for safety, sequence, UE/LE placement, awareness, and balance. Activities performed today included bed mobility training, sup-sit, sit-stand, SPT.    Mobility:  Sup > sit: Max A x 2 to EOB.   Scooting: Max A at hips to scoot.   SPT: Max A x 2 from EOB > recliner with arm-in-arm assist.     Safety:   Pt returned safely to recliner with chair alarm activated, call light in reach, all needs met.   Assessment / Impression:    Pt tolerated transfer well this date but does present with significant pain limiting ambulation.   Patient's tolerance of treatment:  Good   Adverse Reaction: none  Significant change in status and impact:  none  Barriers to improvement:  none  Plan for Next Session:    Plan to continue OOB activities.   Time in:  1150  Time out:  1207  Timed treatment minutes:  17  Total treatment time:  17    Previously filed items:  Social/Functional History  Lives With: Daughter  Type of Home: Trailer  Home Layout: One level  Home Access: Ramped entrance  Bathroom Shower/Tub: Tub/Shower unit  Home Equipment: Walker - Rolling, Wheelchair - Manual  Prior Level of Assist for ADLs: Independent  Prior Level of Assist for 
  Physical Therapy Treatment Note  Name: Kristyn Rascon MRN: [id number] :   1945   Date:  2024   Admission Date: 2024 Room:  [address] [place] [address]   Restrictions/Precautions: WBAT L LE, general precautions, fall risk   Communication with other providers:  Pt okay to see for therapy per RN   Subjective:  Patient states:  \"Girls I am so sorry for all my hollering\"   Pain:   Location, Type, Intensity (0/10 to 10/10):  9/10 pn at L hip.   Objective:    Observation:  Pt supine in bed upon therapy arrival.   Objective Measures:  Tele, stable   Treatment, including education/measures:    Therapeutic Activity Training:   Therapeutic activity training was instructed today.  Cues were given for safety, sequence, UE/LE placement, awareness, and balance. Activities performed today included bed mobility training, sup-sit, sit-stand, SPT.    Mobility:  Sup > sit: NT this date dt very high pn levels.   Rolling: Max A to roll, Mod to maintain rolling. X1 to L X1 to R for pericare and bedding change dt saturation of depends and bed. Pt verbalizing severe pain this date.     Safety:   Pt returned safely to bed with bed alarm activated, call light in reach, all needs met.   Assessment / Impression:    Therapy session limited to bed level activities dt high pn levels.   Patient's tolerance of treatment:  Poor+   Adverse Reaction: Pain  Significant change in status and impact:  none  Barriers to improvement:  Pain  Plan for Next Session:    Plan to continue OOB activities.   Time in:  1341  Time out:  1406  Timed treatment minutes:  25  Total treatment time:  25    Previously filed items:  Social/Functional History  Lives With: Daughter  Type of Home: Trailer  Home Layout: One level  Home Access: Ramped entrance  Bathroom Shower/Tub: Tub/Shower unit  Home Equipment: Walker - Rolling, Wheelchair - Manual  Prior Level of Assist for ADLs: Independent  Prior Level of Assist for Homemaking: Independent  Homemaking Responsibilities: 
  Physician Progress Note      PATIENT:               MINOO BARNES  CSN #:                  [id number]  :                       1945  ADMIT DATE:       2024 8:05 PM  DISCH DATE:  RESPONDING  PROVIDER #:        Leslye Rene MD          QUERY TEXT:    Pt admitted with hip fracture. Pt noted to have hemoglobin 11.2 on arrival   trended to 8.2 on . If possible, please document in the progress notes and   discharge summary if you are evaluating and/or treating any of the following:    The medical record reflects the following:  Risk Factors: 79 yr old female, femur fracture, surgery, CKD, DM2    Clinical Indicators: Op Note -  ml  / H/H = 11.2/35.8   H/H = 10.9/33.9   H/H = 8.2/26.6    Treatment: Ortho consult for ORIF surgery, labs, IVF NS 50 ml/hr  Options provided:  -- Acute blood loss anemia  -- Precipitous drop in Hemoglobin and Hematocrit  -- Other - I will add my own diagnosis  -- Disagree - Not applicable / Not valid  -- Disagree - Clinically unable to determine / Unknown  -- Refer to Clinical Documentation Reviewer    PROVIDER RESPONSE TEXT:    This patient has acute blood loss anemia.    Query created by: Sasha Vega on 2024 9:10 AM      Electronically signed by:  Leslye Rene MD 2024 9:25 AM          
1517 Pt arrived from OR to PACU. Monitors applied and alarms in place. Report rec'd from Haley RN and Chandler HORTON. Pt drowsy from anesthesia. Pt responds able to follow commands  1525 Pt incont. Nallely care performed and pt repositioned in bed  1531 Pt medicated for pain  1535   1545 Pt resting in bed with eyes closed. Eyes open to speech. Pt verbalizes improvement in pain.  1550 Report called to Theresa 4N  1557 Transport at bedside    
4 Eyes Skin Assessment     NAME:  Kristyn Rascon  YOB: 1945  MEDICAL RECORD NUMBER:  [id number]    The patient is being assessed for  Admission    I agree that at least one RN has performed a thorough Head to Toe Skin Assessment on the patient. ALL assessment sites listed below have been assessed.      Areas assessed by both nurses:    Head, Face, Ears, Shoulders, Back, Chest, Arms, Elbows, Hands, Sacrum. Buttock, Coccyx, Ischium, Legs. Feet and Heels, and Under Medical Devices         Does the Patient have a Wound? Yes wound(s) were present on assessment. LDA wound assessment was Initiated and completed by RN     L buttock/hip    Brant Prevention initiated by RN: Yes  Wound Care Orders initiated by RN: Yes    Pressure Injury (Stage 3,4, Unstageable, DTI, NWPT, and Complex wounds) if present, place Wound referral order by RN under : Yes    New Ostomies, if present place, Ostomy referral order under : No     Nurse 1 eSignature: Electronically signed by ZAIN DAVIS RN on 12/8/24 at 2:30 AM EST    **SHARE this note so that the co-signing nurse can place an eSignature**    Nurse 2 eSignature: Electronically signed by Amina Rios RN on 12/8/24 at 2:35 AM EST    
I did NOT see the patient. The patient was discussed with the CELESTINA. I was available for questions and consultation as needed.       Discharged to SNF. Place on lovenox for 4 weeks based on ortho recs.   
Occupational Therapy     Occupational Therapy Treatment Note     Name: Kristyn Rascon MRN: [id number] :             1945   Date:  2024   Admission Date: 2024 Room:  [address] [place], [place] [address]      Restrictions/Precautions:   general precautions, fall risk and WBAT L LE      Communication with other providers: Pt agreeable to therapy session. Co tx with PTA for mobility.     Subjective:  Patient states:  Pt agreeable to therapy session.   Pain:   noted pain in bilateral hips 8/10 RN aware.      Objective:    Observation: Pt supine in bed    Objective Measures:  Pt alert and oriented.      Treatment, including education:  Therapeutic Activity Training:   Therapeutic activity training was instructed today.  Cues were given for safety, sequence, UE/LE placement, awareness, and balance.    Activities performed today included bed mobility training, sup-sit, sit-stand, SPT.     Pt supine in bed and completed sup to sit with MAX A x2.  Pt donned socks prior to mobility with DEP A and noted to scream out in pain with slight movement and/or touch.  Pt demonstrated ability to sit edge of bed approx 6-8 minutes with good balance. Pt completed sit to stand from edge of bed with arm in arm assist with MOD to MAX A x2 and completed SPT from bed to chair.  Pt scooted to in chair with MAX A x2.  Pt seated in chair with all needs met and call light in reach chair alarm on.      Assessment / Impression:    Patient's tolerance of treatment: fair  Adverse Reaction: None  Significant change in status and impact:  Barriers to improvement: None noted        Plan for Next Session:    Continue OT POC     Time in:  1150  Time out: 1207  Timed treatment minutes:  17  Total treatment time:  17        Electronically signed by:       DIANA Cruz  
Occupational Therapy    Occupational Therapy Treatment Note    Name: Kristyn Rascon MRN: [id number] :   1945   Date:  12/10/2024   Admission Date: 2024 Room:  [address] [place], [place] [address]     Restrictions/Precautions:   general precautions, fall risk and WBAT L LE     Communication with other providers: Pt agreeable to therapy session.     Subjective:  Patient states:  Pt agreeable to therapy session.   Pain:   noted pain in bilateral hips /10 RN aware.     Objective:    Observation: Pt supine in bed    Objective Measures:  Pt alert and oriented.     Treatment, including education:  Self Care Training:   Cues were given for safety, sequence, UE/LE placement, visual cues, and balance.    Activities performed today included UB/LB dressing tasks, toileting, hand hygiene at sink    Pt supine in bed and completed sup to sit with MAX A x2.  Pt completed sitting edge of bed with close SBA and completed hair grooming with set up and SBA. Pt demonstrated ability to sit edge of bed approx 6-8 minutes with good balance. Pt completed sit to stand from edge of bed with arm in arm assist with MOD to MAX A x2 and completed SPT from bed to chair. Pt seated in chair for rest break and completed sit to stand from chair to doff and alex undergarment with MAX A for standing and DEP A for pericare.  Pt seated in chair with all needs met and call light in reach chair alarm on.     Assessment / Impression:    Patient's tolerance of treatment: Well  Adverse Reaction: None  Significant change in status and impact: Improved from initial evaluation  Barriers to improvement: None noted      Plan for Next Session:    Continue OT POC    Time in:  1023  Time out:  1054  Timed treatment minutes:  31  Total treatment time:  31      Electronically signed by:      DIANA Cruz          
Occupational Therapy  Occupational Therapy     Occupational Therapy Treatment Note     Name: Kristyn Rascon MRN: [id number] :             1945   Date:  2024   Admission Date: 2024 Room:  [address] [place], [place] [address]      Restrictions/Precautions:   general precautions, fall risk and WBAT L LE      Communication with other providers: Pt agreeable to therapy session.      Subjective:  Patient states:  Pt agreeable to therapy session.   Pain:   noted pain in bilateral hips 8/10 RN aware.      Objective:    Observation: Pt supine in bed    Objective Measures:  Pt alert and oriented.      Treatment, including education:  Self Care Training:   Cues were given for safety, sequence, UE/LE placement, visual cues, and balance.    Activities performed today included UB/LB dressing tasks, toileting, hand hygiene at sink     Pt supine in bed and completed rolling side to side with  MAX A x2.  Pt noted incontinent episode of bladder and doffed and donned undergarment with DEP A and completed DEP A pericare. Pt noted increased pain this date and limited ability to tolerate rolling side to side. Pt boosted in bed with MAX A x2 and doffed and donned gown with SBA. Pt left supine in bed with all needs met and call light in reach. Bed alarm on.        Assessment / Impression:    Patient's tolerance of treatment: fair  Adverse Reaction: None  Significant change in status and impact:  Barriers to improvement: None noted        Plan for Next Session:    Continue OT POC     Time in:  1341  Time out:  1407  Timed treatment minutes:  26  Total treatment time:  26        Electronically signed by:       DIANA Cruz  
Patient instructed and educated on Incentive Spirometer. Patient able to do 1500 ml. Patient's goal is 2500ml.  
Physical Therapy    Physical Therapy Treatment Note  Name: Kristyn Rascon MRN: [id number] :   1945   Date:  12/10/2024   Admission Date: 2024 Room:  [address] [place] [address]   Restrictions/Precautions:          WBAT L LE, general precautions, fall risk   Communication with other providers:  Hand off with Nurse, Co-Tx with RAMIREZ for increased functional mobility.     Subjective:  Patient states: Pt. Agreeable to work with therapy. \"I'm freezing\"   Pain:  (0/10 to 10/10):  8/10 Robby hips   Objective:    Observation: Pt. Supine in bed upon arrival to room     Treatment, including education/measures:  Therapeutic Activity Training:   Therapeutic activity training was instructed today.  Cues were given for safety, sequence, UE/LE placement, awareness, and balance.    Activities performed today included bed mobility training, sup-sit, sit-stand, SPT.    Bed Mobility: MaxA x2  Sit to stand transfer: MaxA , x2 reps   Stand Pivot transfer: ModA x2    Sitting balance:ModA progressing to SBA at EOB with feet on floor.  Standing balance:MaxA      Education:   Pt. Educated on importance of out of bed activity, safe functional mobility    Assessment / Impression:    Pt. Left up in recliner at end of session, all needs met, phone and call light in reach, bed/personal alarm active, and nursing updated.     Patient's tolerance of treatment:  Good   Adverse Reaction: none  Significant change in status and impact:  none  Barriers to improvement:  Decreased strength and endurance   Plan for Next Session:    Cont per POC and progress as able.     Timed Code Treatment Minutes: 29 Minutes  PT Individual Minutes  Time In: 1023  Time Out: 1052  Minutes: 29              Previously filed items:  Social/Functional History  Lives With: Daughter  Type of Home: Trailer  Home Layout: One level  Home Access: Ramped entrance  Bathroom Shower/Tub: Tub/Shower unit  Home Equipment: Walker - Rolling, Wheelchair - Manual  Prior Level of Assist for ADLs: 
[facility]   
[facility]. Was transferred but no one picked up. Will try to call again before pt picked up.   
PRN  sodium chloride, , PRN  potassium chloride, 10 mEq, PRN  magnesium sulfate, 2,000 mg, PRN  ondansetron, 4 mg, Q8H PRN   Or  ondansetron, 4 mg, Q6H PRN  melatonin, 3 mg, Nightly PRN  polyethylene glycol, 17 g, Daily PRN        Labs and Imaging   XR HIP 2-3 VW W PELVIS LEFT    Result Date: 12/7/2024  Left Hip INDICATION: Fell, Pain COMPARISON:  None TECHNIQUE: Three views of the left hip were obtained FINDINGS: See impression.     Acute comminuted subtrochanteric spiral fracture of the left femur with apex lateral angulation and a medial butterfly fragment. No other fractures or dislocation. Electronically signed by Geoff Steele      CBC:   Recent Labs     12/07/24 2015   WBC 13.0*   HGB 11.2*        BMP:    Recent Labs     12/07/24 2015      K 4.8      CO2 21   BUN 22*   CREATININE 1.2   GLUCOSE 185*     Hepatic: No results for input(s): \"AST\", \"ALT\", \"BILITOT\", \"ALKPHOS\" in the last 72 hours.    Invalid input(s): \"ALB\"  Lipids: No results found for: \"CHOL\", \"HDL\", \"TRIG\"  Hemoglobin A1C: No results found for: \"LABA1C\"  TSH: No results found for: \"TSH\"  Troponin: No results found for: \"TROPONINT\"  Lactic Acid: No results for input(s): \"LACTA\" in the last 72 hours.  BNP: No results for input(s): \"PROBNP\" in the last 72 hours.  UA:No results found for: \"NITRU\", \"COLORU\", \"PHUR\", \"LABCAST\", \"WBCUA\", \"RBCUA\", \"MUCUS\", \"TRICHOMONAS\", \"YEAST\", \"BACTERIA\", \"CLARITYU\", \"SPECGRAV\", \"LEUKOCYTESUR\", \"UROBILINOGEN\", \"BILIRUBINUR\", \"BLOODU\", \"GLUCOSEU\", \"KETUA\", \"AMORPHOUS\"  Urine Cultures: No results found for: \"LABURIN\"  Blood Cultures: No results found for: \"BC\"  No results found for: \"BLOODCULT2\"  Organism: No results found for: \"ORG\"      Electronically signed by Joe Santana MD on 12/8/2024 at 11:09 AM   
chloride       PRN Meds: HYDROmorphone, 0.25 mg, Q3H PRN   Or  HYDROmorphone, 0.5 mg, Q3H PRN  ondansetron, 4 mg, Q8H PRN   Or  ondansetron, 4 mg, Q6H PRN  oxyCODONE-acetaminophen, 1 tablet, Q4H PRN  methocarbamol, 750 mg, 4x Daily PRN  glucose, 4 tablet, PRN  dextrose bolus, 125 mL, PRN   Or  dextrose bolus, 250 mL, PRN  glucagon (rDNA), 1 mg, PRN  dextrose, , Continuous PRN  sodium chloride flush, 5-40 mL, PRN  sodium chloride, , PRN  potassium chloride, 10 mEq, PRN  magnesium sulfate, 2,000 mg, PRN  melatonin, 3 mg, Nightly PRN  polyethylene glycol, 17 g, Daily PRN        Labs and Imaging   XR HIP 2-3 VW W PELVIS LEFT    Result Date: 12/7/2024  Left Hip INDICATION: Fell, Pain COMPARISON:  None TECHNIQUE: Three views of the left hip were obtained FINDINGS: See impression.     Acute comminuted subtrochanteric spiral fracture of the left femur with apex lateral angulation and a medial butterfly fragment. No other fractures or dislocation. Electronically signed by Geoff Steele      CBC:   Recent Labs     12/08/24  1000 12/09/24  0854   WBC 8.4 8.7   HGB 10.9* 8.2*    262     BMP:    Recent Labs     12/08/24  1000 12/09/24  0854    131*   K 4.9 5.1    100   CO2 21 21   BUN 23* 27*   CREATININE 1.1 1.2   GLUCOSE 279* 284*     Hepatic:   Recent Labs     12/08/24  1000   AST 32   ALT 15   BILITOT 0.3   ALKPHOS 80     Lipids: No results found for: \"CHOL\", \"HDL\", \"TRIG\"  Hemoglobin A1C: No results found for: \"LABA1C\"  TSH:   Lab Results   Component Value Date/Time    TSH 5.96 12/08/2024 10:00 AM     Troponin: No results found for: \"TROPONINT\"  Lactic Acid: No results for input(s): \"LACTA\" in the last 72 hours.  BNP: No results for input(s): \"PROBNP\" in the last 72 hours.  UA:No results found for: \"NITRU\", \"COLORU\", \"PHUR\", \"LABCAST\", \"WBCUA\", \"RBCUA\", \"MUCUS\", \"TRICHOMONAS\", \"YEAST\", \"BACTERIA\", \"CLARITYU\", \"SPECGRAV\", \"LEUKOCYTESUR\", \"UROBILINOGEN\", \"BILIRUBINUR\", \"BLOODU\", \"GLUCOSEU\", \"KETUA\", 
\"CLARITYU\", \"SPECGRAV\", \"LEUKOCYTESUR\", \"UROBILINOGEN\", \"BILIRUBINUR\", \"BLOODU\", \"GLUCOSEU\", \"KETUA\", \"AMORPHOUS\"  Urine Cultures: No results found for: \"LABURIN\"  Blood Cultures: No results found for: \"BC\"  No results found for: \"BLOODCULT2\"  Organism: No results found for: \"ORG\"      Electronically signed by Leslye Rene MD on 12/10/2024 at 9:16 AM

## 2024-12-12 NOTE — DISCHARGE INSTR - COC
Continuity of Care Form    Patient Name: Kristyn Rascon   :  1945  MRN:  [id number]    Admit date:  2024  Discharge date:  ***    Code Status Order: Full Code   Advance Directives:   Advance Care Flowsheet Documentation        Date/Time Healthcare Directive Type of Healthcare Directive Copy in Chart Healthcare Agent Appointed Healthcare Agent's Name Healthcare Agent's Phone Number    24 1241 No, patient does not have an advance directive for healthcare treatment  --  --  --  --  --                     Admitting Physician:  Dax Connors MD  PCP: Elle Hidalgo APRN - NP    Discharging Nurse: Lorraine MALHOTRA  Discharging Hospital Unit/Room#: 4130/4130-A  Discharging Unit Phone Number: [phone]    Emergency Contact:   Extended Emergency Contact Information  Primary Emergency Contact: CORBIN GUILLORY  Home Phone: [phone]  Work Phone: [phone]  Mobile Phone: [phone]  Relation: Child    Past Surgical History:  Past Surgical History:   Procedure Laterality Date    BACK SURGERY      CAROTID STENT PLACEMENT       SECTION      CHOLECYSTECTOMY      FEMUR SURGERY Left 2024    FEMUR INTRAMEDULLARY NAIL ESEQUIEL INSERTION ANTEGRADE performed by Christopher Sanabria MD at [facility] OR       Immunization History:     There is no immunization history on file for this patient.    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

## 2024-12-12 NOTE — PLAN OF CARE
Problem: Chronic Conditions and Co-morbidities  Goal: Patient's chronic conditions and co-morbidity symptoms are monitored and maintained or improved  12/12/2024 0930 by Lorraine Brooks RN  Outcome: Progressing  12/12/2024 0140 by Ute Stewart LPN  Outcome: Progressing     Problem: Discharge Planning  Goal: Discharge to home or other facility with appropriate resources  Outcome: Progressing     Problem: Pain  Goal: Verbalizes/displays adequate comfort level or baseline comfort level  Outcome: Progressing     Problem: Safety - Adult  Goal: Free from fall injury  Outcome: Progressing     Problem: Skin/Tissue Integrity  Goal: Absence of new skin breakdown  Description: 1.  Monitor for areas of redness and/or skin breakdown  2.  Assess vascular access sites hourly  3.  Every 4-6 hours minimum:  Change oxygen saturation probe site  4.  Every 4-6 hours:  If on nasal continuous positive airway pressure, respiratory therapy assess nares and determine need for appliance change or resting period.  Outcome: Progressing     Problem: ABCDS Injury Assessment  Goal: Absence of physical injury  Outcome: Progressing     Problem: Nutrition Deficit:  Goal: Optimize nutritional status  Outcome: Progressing

## 2024-12-12 NOTE — DISCHARGE SUMMARY
V2.0  Discharge Summary    Name:  Kristyn Rascon /Age/Sex: 1945 (79 y.o. female)   Admit Date: 2024  Discharge Date: 24    MRN & CSN:  [id number] & [id number] Encounter Date and Time 24 12:21 PM EST    Attending:  Eugenia Edwards MD Discharging Provider: KARYNA Henley - CNP       Hospital Course:     Brief HPI: Kristyn Rascon is a 79 y.o. female with a history of essential hypertension, SKIP, type 2 diabetes, CKD stage III, hypothyroidism who presented with closed left hip fracture.  Orthopedics Dr. Sanabria performed open reduction internal fixation of left subtrochanteric femur fracture with intramedullary nail and cerclage cable.  PT OT evaluated the patient and recommended SNF.  Patient was approved for SNF today.  Patient will be transferred to [facility] today.    Brief Problem Based Course:   # Closed left hip fracture  -Reported fall while getting into the shower and slipping leading to left hip pain. No head trauma or LOC. Not on anticoagulants. Ambulates with walker, no recent falls.  - NVI distally. XR showed acute comminuted subtrochanteric spiral fracture of left femur.  -Orthopedic surgery consulted and patient underwent intramedullary nail erinn insertion 2022-  -Continue postop management per orthopedic surgery recs  -PT/OT consulted, recommend SNF.  Patient is approved to SNF today     # Age-related osteoporosis  - No previous osteoporosis evaluation.  - Clinically has pathological fracture secondary to osteoporosis.  - Started Ca and VD3, will benefit from osteoporosis evaluation in a few weeks outpatient.     # Essential hypertension  - Elevated secondary to above.  - Continue Coreg, optimize pain management first.     # Suspected SKIP  - STOP-BANG high. No previous PSG.  - Requiring 2L NC while asleep, refused trial of CPAP.     # T2DM with hyperglycemia and peripheral neuropathy  - No A1c on file, ordered.    - Held metformin.  - LCSI. Continue gabapentin.     #

## 2024-12-13 NOTE — CARE COORDINATION
Received voicemail from [facility] admissions, that electronic PASRR needed. Electronic PASRR completed at this time.

## (undated) DEVICE — COUNTER NDL 30 COUNT FOAM STRP SGL MAG

## (undated) DEVICE — DRESSING HYDROFIBER AQUACEL AG ADVANTAGE 3.5X6 IN

## (undated) DEVICE — Device: Brand: COVER, PERINEAL POST, 12 PK

## (undated) DEVICE — BANDAGE,SELF ADHRNT,COFLEX,4"X5YD,STRL: Brand: COLABEL

## (undated) DEVICE — SUTURE VICRYL SZ 0 L27IN ABSRB UD L36MM CT-1 1/2 CIR J260H

## (undated) DEVICE — MARKER SURG SKIN UTIL REGULAR/FINE 2 TIP W/ RUL AND 9 LBL

## (undated) DEVICE — Z INACTIVE NO ACTIVE SUPPLIER APPLICATOR MEDICATED 26 CC TINT HI-LITE ORNG STRL CHLORAPREP

## (undated) DEVICE — DRAPE SHEET ULTRAGARD: Brand: MEDLINE

## (undated) DEVICE — SPONGE LAP W18XL18IN WHT COT 4 PLY FLD STRUNG RADPQ DISP ST 2 PER PACK

## (undated) DEVICE — DRESSING HYDROFIBER AQUACEL AG ADVANT 3.5X4 IN

## (undated) DEVICE — SYRINGE IRRIG 60ML SFT PLIABLE BLB EZ TO GRP 1 HND USE W/

## (undated) DEVICE — DRESSING,GAUZE,XEROFORM,CURAD,5"X9",ST: Brand: CURAD

## (undated) DEVICE — 6617 IOBAN II PATIENT ISOLATION DRAPE 5/BX,4BX/CS: Brand: STERI-DRAPE™ IOBAN™ 2

## (undated) DEVICE — TUBING, SUCTION, 3/16" X 10', STRAIGHT: Brand: MEDLINE

## (undated) DEVICE — LINER,SEMI-RIGID,3000CC,50EA/CS: Brand: MEDLINE

## (undated) DEVICE — GOWN,SIRUS,POLYRNF,BRTHSLV,XLN/XL,20/CS: Brand: MEDLINE

## (undated) DEVICE — GLOVE SURG SZ 75 CRM LTX FREE POLYISOPRENE POLYMER BEAD ANTI

## (undated) DEVICE — DRESSING HYDROFIBER AQUACEL AG ADVANTAGE 3.5X10 IN

## (undated) DEVICE — SUTURE VICRYL SZ 2-0 L18IN ABSRB UD CT-1 L36MM 1/2 CIR J839D

## (undated) DEVICE — DRESSING TRNSPAR W8XL12IN FLM SURESITE 123

## (undated) DEVICE — TOWEL,OR,DSP,ST,BLUE,STD,6/PK,12PK/CS: Brand: MEDLINE

## (undated) DEVICE — GUIDEWIRE ORTH L400MM DIA3.2MM FOR TFN

## (undated) DEVICE — GLOVE SURG SZ 8 CRM LTX FREE POLYISOPRENE POLYMER BEAD ANTI

## (undated) DEVICE — YANKAUER,FLEXIBLE HANDLE,REGLR CAPACITY: Brand: MEDLINE INDUSTRIES, INC.

## (undated) DEVICE — ROD RMR L950MM DIA2.5MM W/ EXTN BALL TIP

## (undated) DEVICE — 3M™ MICROFOAM™ TAPE 1528-4: Brand: 3M™ MICROFOAM™

## (undated) DEVICE — MAT ABSRB W28XL48IN C6L FLR ULT W POLY BK

## (undated) DEVICE — PACK,BASIC,IX: Brand: MEDLINE

## (undated) DEVICE — SPONGE GZ W4XL8IN COT WVN 12 PLY

## (undated) DEVICE — PAD,ABDOMINAL,5"X9",ST,LF,25/BX: Brand: MEDLINE INDUSTRIES, INC.

## (undated) DEVICE — ELECTRODE ES AD CRDLSS PT RET REM POLYHESIVE

## (undated) DEVICE — Device

## (undated) DEVICE — BIT DRL L145MM DIA4.2MM NONSTERILE 3 FLUT NDL PNT QUIK CPL

## (undated) DEVICE — Z INACTIVE USE 2660663 SOLUTION IRRIG 1000ML STRIL H2O USP PLAS POUR BTL

## (undated) DEVICE — COTTON UNDERCAST PADDING,CRIMPED FINISH: Brand: WEBRIL

## (undated) DEVICE — BANDAGE,GAUZE,BULKEE II,4.5"X4.1YD,STRL: Brand: MEDLINE

## (undated) DEVICE — SOLUTION IV 1000ML 0.9% SOD CHL FOR IRRIG PLAS CONT

## (undated) DEVICE — SUTURE ETHILON SZ 3-0 L30IN NONABSORBABLE BLK FS-1 L24MM 3/8 669H

## (undated) DEVICE — ROD RMR L950MM DIA3MM W/ STR BALL TIP

## (undated) DEVICE — INTENDED FOR TISSUE SEPARATION, AND OTHER PROCEDURES THAT REQUIRE A SHARP SURGICAL BLADE TO PUNCTURE OR CUT.: Brand: BARD-PARKER ® STAINLESS STEEL BLADES

## (undated) DEVICE — SUTURE ABSORBABLE MONOFILAMENT 2-0 CT-1 24 CM 36 MM VIO PDS+